# Patient Record
Sex: FEMALE | Race: WHITE | NOT HISPANIC OR LATINO | Employment: PART TIME | URBAN - METROPOLITAN AREA
[De-identification: names, ages, dates, MRNs, and addresses within clinical notes are randomized per-mention and may not be internally consistent; named-entity substitution may affect disease eponyms.]

---

## 2017-04-19 ENCOUNTER — APPOINTMENT (OUTPATIENT)
Dept: RADIOLOGY | Facility: IMAGING CENTER | Age: 57
End: 2017-04-19
Attending: NURSE PRACTITIONER
Payer: COMMERCIAL

## 2017-04-19 ENCOUNTER — HOSPITAL ENCOUNTER (OUTPATIENT)
Facility: MEDICAL CENTER | Age: 57
End: 2017-04-19
Attending: OBSTETRICS & GYNECOLOGY
Payer: COMMERCIAL

## 2017-04-19 ENCOUNTER — OFFICE VISIT (OUTPATIENT)
Dept: URGENT CARE | Facility: PHYSICIAN GROUP | Age: 57
End: 2017-04-19
Payer: COMMERCIAL

## 2017-04-19 VITALS
DIASTOLIC BLOOD PRESSURE: 86 MMHG | OXYGEN SATURATION: 100 % | RESPIRATION RATE: 16 BRPM | HEART RATE: 63 BPM | SYSTOLIC BLOOD PRESSURE: 130 MMHG | BODY MASS INDEX: 22.82 KG/M2 | TEMPERATURE: 99.1 F | WEIGHT: 142 LBS | HEIGHT: 66 IN

## 2017-04-19 DIAGNOSIS — S99.921A TOE INJURY, RIGHT, INITIAL ENCOUNTER: ICD-10-CM

## 2017-04-19 PROCEDURE — 88175 CYTOPATH C/V AUTO FLUID REDO: CPT

## 2017-04-19 PROCEDURE — 99202 OFFICE O/P NEW SF 15 MIN: CPT | Performed by: NURSE PRACTITIONER

## 2017-04-19 PROCEDURE — 73630 X-RAY EXAM OF FOOT: CPT | Mod: TC,RT | Performed by: NURSE PRACTITIONER

## 2017-04-19 PROCEDURE — 87624 HPV HI-RISK TYP POOLED RSLT: CPT

## 2017-04-19 PROCEDURE — L9900 O&P SUPPLY/ACCESSORY/SERVICE: HCPCS | Mod: RT | Performed by: NURSE PRACTITIONER

## 2017-04-19 ASSESSMENT — ENCOUNTER SYMPTOMS: CONSTITUTIONAL NEGATIVE: 1

## 2017-04-19 NOTE — MR AVS SNAPSHOT
"        Autumn Juárez   2017 3:45 PM   Office Visit   MRN: [id number]    Department:  [facility]   Dept Phone:  [phone]    Description:  Female : 1960   Provider:  Cathey J Hamman, A.P.N.           Reason for Visit     Toe Injury C/o RT toe pain, swollen, bruised x2 days.  PT stubbed her toe monday morning and it's still painful.      Allergies as of 2017     No Known Allergies      You were diagnosed with     Toe injury, right, initial encounter   [380730]         Vital Signs     Blood Pressure Pulse Temperature Respirations Height Weight    130/86 mmHg 63 37.3 °C (99.1 °F) 16 1.676 m (5' 5.98\") 64.411 kg (142 lb)    Body Mass Index Oxygen Saturation Smoking Status             22.93 kg/m2 100% Never Smoker          Basic Information     Date Of Birth Sex Race Ethnicity Preferred Language    1960 Female White Non- English      Your appointments     May 15, 2017  2:50 PM   MA SCRN10 with RBHC MG 2   [facility] BREAST [facility] ([address])    [address] [place] [address] [address]  [facility] [address]   [phone]           No deodorant, powder, perfume or lotion under the arm or breast area.              Problem List              ICD-10-CM Priority Class Noted - Resolved    Hypertension (Chronic) I10   8/3/2010 - Present    Preventative health care (Chronic) Z00.00   8/3/2010 - Present    Dyslipidemia (Chronic) E78.5   8/15/2010 - Present    Tonsillar hypertrophy J35.1   2010 - Present    Social anxiety disorder F40.10   2014 - Present    Dry eye syndrome H04.129   2016 - Present      Health Maintenance        Date Due Completion Dates    MAMMOGRAM 5/10/2017 5/10/2016, 2015, 2014, 5/3/2013, 2012, 3/9/2011, 2010, 2010, 2010, 2/3/2010, 2/3/2010, 2008, 2008, 2008, 2008, 2007, 11/15/2007, 11/15/2007, 2006, 2005, 2004    PAP SMEAR 2019    IMM DTaP/Tdap/Td Vaccine (2 - Td) " 10/18/2022 10/18/2012    COLONOSCOPY 6/6/2024 6/6/2014            Current Immunizations     Hepatitis A Vaccine, Adult 12/2/2016    Influenza TIV (IM) 10/18/2012    Influenza Vaccine Quad Inj (Pf) 10/3/2016  8:20 AM, 11/9/2015  8:02 AM, 10/13/2014    Influenza Vaccine Quad Inj (Preserved) 11/4/2013    Tdap Vaccine 10/18/2012      Below and/or attached are the medications your provider expects you to take. Review all of your home medications and newly ordered medications with your provider and/or pharmacist. Follow medication instructions as directed by your provider and/or pharmacist. Please keep your medication list with you and share with your provider. Update the information when medications are discontinued, doses are changed, or new medications (including over-the-counter products) are added; and carry medication information at all times in the event of emergency situations     Allergies:  No Known Allergies          Medications  Valid as of: April 19, 2017 -  5:00 PM    Generic Name Brand Name Tablet Size Instructions for use    Aspirin (Chew Tab) ASA 81 MG Take 1 Tab by mouth every day.        Calcium-Magnesium-Vitamin D   Take  by mouth.        Cetirizine HCl   Take  by mouth.        Cholecalciferol   Take  by mouth.        Fish Oil   by Does not apply route.        Metoprolol Succinate (TABLET SR 24 HR) TOPROL XL 25 MG Take 1 Tab by mouth every day.        Multiple Vitamins-Minerals   Take  by mouth.        .                 Medicines prescribed today were sent to:     [facility]/PHARMACY #[id number] - [place] - [address] [place] [address]    Phone: [phone] Fax: [phone]    Open 24 Hours?: No      Medication refill instructions:       If your prescription bottle indicates you have medication refills left, it is not necessary to call your provider’s office. Please contact your pharmacy and they will refill your medication.    If your prescription bottle indicates you do not have any refills  left, you may request refills at any time through one of the following ways: The online [employer] system (except Urgent Care), by calling your provider’s office, or by asking your pharmacy to contact your provider’s office with a refill request. Medication refills are processed only during regular business hours and may not be available until the next business day. Your provider may request additional information or to have a follow-up visit with you prior to refilling your medication.   *Please Note: Medication refills are assigned a new Rx number when refilled electronically. Your pharmacy may indicate that no refills were authorized even though a new prescription for the same medication is available at the pharmacy. Please request the medicine by name with the pharmacy before contacting your provider for a refill.        Your To Do List     Future Labs/Procedures Complete By Expires    DX-FOOT-COMPLETE 3+ RIGHT  As directed 4/19/2018      Other Notes About Your Plan     FIT  3/8/16 blood pressure elevated question white coat syndrome, check blood pressures and record, send me readings one to 2 months                   G[employer] Access Code: [email or web address]  Expires: 5/19/2017  5:00 PM    [employer]ogo[employer]in  A secure, online tool to manage your health information     [employer]’s GogoCoin® is a secure, online tool that connects you to your personalized health information from the privacy of your home -- day or night - making it very easy for you to manage your healthcare. Once the activation process is completed, you can even access your medical information using the GogoCoin gato, which is available for free in the Apple Gato store or Google Play store.     GogoCoin provides the following levels of access (as shown below):   My Chart Features   [facility] Doctor [facility]  Specialists [facility]gent  Care [facility]  Primary Care  Doctor   Email your healthcare team securely and privately 24/7 X X X    Manage  appointments: schedule your next appointment; view details of past/upcoming appointments X      Request prescription refills. X      View recent personal medical records, including lab and immunizations X X X X   View health record, including health history, allergies, medications X X X X   Read reports about your outpatient visits, procedures, consult and ER notes X X X X   See your discharge summary, which is a recap of your hospital and/or ER visit that includes your diagnosis, lab results, and care plan. X X       How to register for [employer]:  1. Go to  [email or web address].  2. Click on the Sign Up Now box, which takes you to the New Member Sign Up page. You will need to provide the following information:  a. Enter your NatureBox Access Code exactly as it appears at the top of this page. (You will not need to use this code after you’ve completed the sign-up process. If you do not sign up before the expiration date, you must request a new code.)   b. Enter your date of birth.   c. Enter your home email address.   d. Click Submit, and follow the next screen’s instructions.  3. Create a NatureBox ID. This will be your NatureBox login ID and cannot be changed, so think of one that is secure and easy to remember.  4. Create a NatureBox password. You can change your password at any time.  5. Enter your Password Reset Question and Answer. This can be used at a later time if you forget your password.   6. Enter your e-mail address. This allows you to receive e-mail notifications when new information is available in NatureBox.  7. Click Sign Up. You can now view your health information.    For assistance activating your NatureBox account, call [phone]

## 2017-04-19 NOTE — Clinical Note
April 19, 2017       Patient: Autumn Juárez   YOB: 1960   Date of Visit: 4/19/2017         To Whom It May Concern:    It is my medical opinion that Autumn Juárez may return to work on 4/24/17 .    If you have any questions or concerns, please don't hesitate to call [phone]          Sincerely,          Cathey J Hamman, A.P.N.  Electronically Signed

## 2017-04-19 NOTE — PROGRESS NOTES
"Subjective:      Autumn Juárez is a 56 y.o. female who presents with Toe Injury    PMH: no history of chronic illness    Social hx: non-smoker    Family hx: not pertinent to today's visit    Allergies: Review of patient's allergies indicates no known allergies.           This patient is a 56-year-old female who presents today with complaint of pain to the right second third and fourth toe. On Monday morning very early patient was getting out of bed and accidentally stubbed her toes against a large weight that was on the floor. She has been having pain and swelling with contusion ever since. She denies any other injuries.    Toe Injury  This is a new problem. The current episode started in the past 7 days. The problem occurs constantly. The problem has been unchanged. Nothing aggravates the symptoms. She has tried nothing for the symptoms. The treatment provided no relief.       Review of Systems   Constitutional: Negative.    Musculoskeletal:        Contusion and discoloration with pain to the right second third and fourth toe.          Objective:     /86 mmHg  Pulse 63  Temp(Src) 37.3 °C (99.1 °F)  Resp 16  Ht 1.676 m (5' 5.98\")  Wt 64.411 kg (142 lb)  BMI 22.93 kg/m2  SpO2 100%     Physical Exam   Constitutional: She is oriented to person, place, and time. She appears well-developed and well-nourished. No distress.   Musculoskeletal:        Right foot: There is decreased range of motion, tenderness and swelling.        Feet:    Contusion with discoloration over the right second third and fourth toe. Patient has decreased range of motion in the fourth toe with flexion.   Neurological: She is alert and oriented to person, place, and time.   Skin: Skin is warm and dry. She is not diaphoretic.   Psychiatric: She has a normal mood and affect. Her behavior is normal.   Vitals reviewed.    X-ray, foot:    4/19/2017 4:21 PM    HISTORY/REASON FOR EXAM:  Pain/Deformity Following Trauma.      TECHNIQUE/EXAM " DESCRIPTION:  3 views RIGHT foot.    COMPARISON:  None.    FINDINGS:  There is a fracture of the proximal phalanx of the right fourth toe. No other fractures identified.         Impression        Fracture proximal phalanx of the right fourth toe.                 Assessment/Plan:     1. Toe injury, right, initial encounter    - DX-FOOT-COMPLETE 3+ RIGHT; Future  -fracture of the proximal phalanx of the right fourth toe  -post op shoe  -elevate  -ice  -ibuprofen  -follow up ifs ymptoms persist or worsen

## 2017-04-21 LAB
CYTOLOGY REG CYTOL: NORMAL
HPV HR 12 DNA CVX QL NAA+PROBE: NEGATIVE
HPV16 DNA SPEC QL NAA+PROBE: NEGATIVE
HPV18 DNA SPEC QL NAA+PROBE: NEGATIVE
SPECIMEN SOURCE: NORMAL

## 2017-05-11 ENCOUNTER — OFFICE VISIT (OUTPATIENT)
Dept: MEDICAL GROUP | Facility: MEDICAL CENTER | Age: 57
End: 2017-05-11
Payer: COMMERCIAL

## 2017-05-11 ENCOUNTER — TELEPHONE (OUTPATIENT)
Dept: MEDICAL GROUP | Facility: MEDICAL CENTER | Age: 57
End: 2017-05-11

## 2017-05-11 VITALS
WEIGHT: 142 LBS | SYSTOLIC BLOOD PRESSURE: 126 MMHG | TEMPERATURE: 99.2 F | HEIGHT: 66 IN | OXYGEN SATURATION: 98 % | BODY MASS INDEX: 22.82 KG/M2 | DIASTOLIC BLOOD PRESSURE: 80 MMHG | HEART RATE: 61 BPM

## 2017-05-11 DIAGNOSIS — R22.31 MASS OF RIGHT AXILLA: ICD-10-CM

## 2017-05-11 DIAGNOSIS — Z00.00 PREVENTATIVE HEALTH CARE: Chronic | ICD-10-CM

## 2017-05-11 DIAGNOSIS — R22.31 AXILLARY MASS, RIGHT: ICD-10-CM

## 2017-05-11 DIAGNOSIS — I10 ESSENTIAL HYPERTENSION: Chronic | ICD-10-CM

## 2017-05-11 PROCEDURE — 99213 OFFICE O/P EST LOW 20 MIN: CPT | Performed by: INTERNAL MEDICINE

## 2017-05-11 ASSESSMENT — PATIENT HEALTH QUESTIONNAIRE - PHQ9: CLINICAL INTERPRETATION OF PHQ2 SCORE: 0

## 2017-05-11 NOTE — MR AVS SNAPSHOT
"        Autumn Juárez   2017 2:20 PM   Office Visit   MRN: [id number]    Department:  [facility]   Dept Phone:  [phone]    Description:  Female : 1960   Provider:  Master Dash M.D.           Allergies as of 2017     No Known Allergies      You were diagnosed with     Axillary mass, right   [008858]       Essential hypertension   [4798764]       Preventative health care   [349661]         Vital Signs     Blood Pressure Pulse Temperature Height Weight Body Mass Index    126/80 mmHg 61 37.3 °C (99.2 °F) 1.676 m (5' 6\") 64.411 kg (142 lb) 22.93 kg/m2    Oxygen Saturation Smoking Status                98% Never Smoker           Basic Information     Date Of Birth Sex Race Ethnicity Preferred Language    1960 Female White Non- English      Your appointments     May 15, 2017  2:50 PM   MA SCRN10 with RBHC MG 2   [facility] BREAST [facility] ([address])    [address] [place] [address] [address]  [facility] [address] [phone]           No deodorant, powder, perfume or lotion under the arm or breast area.              Problem List              ICD-10-CM Priority Class Noted - Resolved    Hypertension (Chronic) I10   8/3/2010 - Present    Preventative health care (Chronic) Z00.00   8/3/2010 - Present    Dyslipidemia (Chronic) E78.5   8/15/2010 - Present    Tonsillar hypertrophy J35.1   2010 - Present    Social anxiety disorder F40.10   2014 - Present    Dry eye syndrome H04.129   2016 - Present      Health Maintenance        Date Due Completion Dates    MAMMOGRAM 5/10/2017 5/10/2016, 2015, 2014, 5/3/2013, 2012, 3/9/2011, 2010, 2010, 2010, 2/3/2010, 2/3/2010, 2008, 2008, 2008, 2008, 2007, 11/15/2007, 11/15/2007, 2006, 2005, 2004    PAP SMEAR 2020, 2016    IMM DTaP/Tdap/Td Vaccine (2 - Td) 10/18/2022 10/18/2012    COLONOSCOPY 2024            Current " Immunizations     Hepatitis A Vaccine, Adult 12/2/2016    Influenza TIV (IM) 10/18/2012    Influenza Vaccine Quad Inj (Pf) 10/3/2016  8:20 AM, 11/9/2015  8:02 AM, 10/13/2014    Influenza Vaccine Quad Inj (Preserved) 11/4/2013    Tdap Vaccine 10/18/2012      Below and/or attached are the medications your provider expects you to take. Review all of your home medications and newly ordered medications with your provider and/or pharmacist. Follow medication instructions as directed by your provider and/or pharmacist. Please keep your medication list with you and share with your provider. Update the information when medications are discontinued, doses are changed, or new medications (including over-the-counter products) are added; and carry medication information at all times in the event of emergency situations     Allergies:  No Known Allergies          Medications  Valid as of: May 11, 2017 -  3:03 PM    Generic Name Brand Name Tablet Size Instructions for use    Aspirin (Chew Tab) ASA 81 MG Take 1 Tab by mouth every day.        Calcium-Magnesium-Vitamin D   Take  by mouth.        Cetirizine HCl   Take  by mouth.        Cholecalciferol   Take  by mouth.        Fish Oil   by Does not apply route.        Metoprolol Succinate (TABLET SR 24 HR) TOPROL XL 25 MG Take 1 Tab by mouth every day.        Multiple Vitamins-Minerals   Take  by mouth.        .                 Medicines prescribed today were sent to:     [facility]/PHARMACY #[id number] - [place] - [address] [place] [address]    Phone: [phone] Fax: [phone]    Open 24 Hours?: No      Medication refill instructions:       If your prescription bottle indicates you have medication refills left, it is not necessary to call your provider’s office. Please contact your pharmacy and they will refill your medication.    If your prescription bottle indicates you do not have any refills left, you may request refills at any time through one of the following ways:  The online [employer] system (except Urgent Care), by calling your provider’s office, or by asking your pharmacy to contact your provider’s office with a refill request. Medication refills are processed only during regular business hours and may not be available until the next business day. Your provider may request additional information or to have a follow-up visit with you prior to refilling your medication.   *Please Note: Medication refills are assigned a new Rx number when refilled electronically. Your pharmacy may indicate that no refills were authorized even though a new prescription for the same medication is available at the pharmacy. Please request the medicine by name with the pharmacy before contacting your provider for a refill.        Your To Do List     Future Labs/Procedures Complete By Expires    US-BREAST COMPLETE-RIGHT  As directed 5/11/2018    Scheduling Instructions:    ultrasound right axillary region for right axillary mass      Other Notes About Your Plan     FIT  5/11/17 right axillary cyst, decreasing in size, no evidence of abscess, ultrasound right axillary region ordered with upcoming mammogram                 [employer] Access Code: [email or web address]  Expires: 5/19/2017  5:00 PM    [employer]  A secure, online tool to manage your health information     [employer]’s [employer]® is a secure, online tool that connects you to your personalized health information from the privacy of your home -- day or night - making it very easy for you to manage your healthcare. Once the activation process is completed, you can even access your medical information using the [employer] gato, which is available for free in the Apple Gato store or Google Play store.     [employer] provides the following levels of access (as shown below):   My Chart Features   [facility] Doctor [facility]  Specialists [facility]  [facility]gent  Care [facility]  Primary Care  Doctor   Email your healthcare team securely and privately 24/7 X X X     Manage appointments: schedule your next appointment; view details of past/upcoming appointments X      Request prescription refills. X      View recent personal medical records, including lab and immunizations X X X X   View health record, including health history, allergies, medications X X X X   Read reports about your outpatient visits, procedures, consult and ER notes X X X X   See your discharge summary, which is a recap of your hospital and/or ER visit that includes your diagnosis, lab results, and care plan. X X       How to register for [employer]  1. Go to  [email or web address].  2. Click on the Sign Up Now box, which takes you to the New Member Sign Up page. You will need to provide the following information:  a. Enter your [employer] Access Code exactly as it appears at the top of this page. (You will not need to use this code after you’ve completed the sign-up process. If you do not sign up before the expiration date, you must request a new code.)   b. Enter your date of birth.   c. Enter your home email address.   d. Click Submit, and follow the next screen’s instructions.  3. Create a [employer] ID. This will be your [employer] login ID and cannot be changed, so think of one that is secure and easy to remember.  4. Create a [employer] password. You can change your password at any time.  5. Enter your Password Reset Question and Answer. This can be used at a later time if you forget your password.   6. Enter your e-mail address. This allows you to receive e-mail notifications when new information is available in [employer]  7. Click Sign Up. You can now view your health information.    For assistance activating your [employer] account, call [phone]

## 2017-05-11 NOTE — PROGRESS NOTES
Subjective:      Autumn Juárez is a 56 y.o. female who presents with lump        HPI  Has had right axillary lump for over twenty years, one week noticed a new bump under the arm, no redness, no drainage, no fever or chills, no trauma to the area, no insect bite, did have some swelling in his knee, now that area has been shrinking in size, still nontender.  No history of breast cancer.  No right shoulder or upper arm pain or swelling.  Three weeks ago accidentally kicked a weight/dumbbell and injured her right fourth toe, seen in urgent care proximal right fourth toe fracture, slowly improving, still with some pain, works as a  is on her feet all day.  Also working out with a  twice a week.  No numbness or tingling.  No regular anti-inflammatories.  Hypertension stable and controlled on Toprol, blood pressures have been controlled  No history of recurrent skin infections  No history of skin cancer  No history diabetes  No recent antibiotics, history needs suppression  No tobacco          Current Outpatient Prescriptions   Medication Sig Dispense Refill   • Multiple Vitamins-Minerals (MULTIVITAMIN PO) Take  by mouth.     • Cholecalciferol (VITAMIN D PO) Take  by mouth.     • Cetirizine HCl (ZYRTEC ALLERGY PO) Take  by mouth.     • metoprolol SR (TOPROL XL) 25 MG TABLET SR 24 HR Take 1 Tab by mouth every day. 90 Tab 1   • aspirin (ASA) 81 MG CHEW Take 1 Tab by mouth every day. 100 Tab 11   • FISH OIL by Does not apply route.     • Calcium Carbonate (CALCIUM 500 PO) Take  by mouth.       No current facility-administered medications for this visit.     Patient Active Problem List    Diagnosis Date Noted   • Dry eye syndrome 09/28/2016   • Social anxiety disorder 11/14/2014   • Tonsillar hypertrophy 12/17/2010   • Dyslipidemia 08/15/2010   • Hypertension 08/03/2010   • Preventative health care 08/03/2010       Dyslipidemia  8/10 chol 195,trig 63,hdl 62,ldl 120  8/11 chol 199,trig 63,hdl 54,ldl  132  5/13 chol 184,trig 56,hdl 58,ldl 115  4/18/14 chol 194,trig 71,hdl 60,ldl 120  5/5/14 chol 207,trig 124,hdl 57,ldl 125,LDL-P 1380,HDL-P 34,LP-IR <25; 10 year risk 2.1%  8/31/15 chol 201,trig 154,hdl 49,ldl 121  9/17/16 chol 197,trig 76,hdl 68,ldl 114    Hypertension  4/28/14 on toprol and start asa  5/2/14 urine mac <0.5 on toprol 25 mg  9/17/16 urine mac <0.7 on toprol 25 mg     Preventative health  10/18/12 tdap  4/28/14 pap per salvador  6/6/14 colon by [facility] diverticulosis  5/10/16 mammogram dense breast tissue  9/17/16 vit d 25 start 4000 units daily    Social anxiety disorder  11/14/14 social anxiety related to  who passed away being controlling, referral to behavioral health  12/17/14 behavioral health note      ROS       Objective:         Physical Exam   Constitutional: She appears well-developed and well-nourished. No distress.   HENT:   Head: Normocephalic and atraumatic.   Eyes: Conjunctivae are normal. Right eye exhibits no discharge. Left eye exhibits no discharge.   Neck: Neck supple. No JVD present. No thyromegaly present.   Cardiovascular: Normal rate and regular rhythm.    Pulmonary/Chest: Effort normal and breath sounds normal. No respiratory distress. She has no wheezes.   Abdominal: She exhibits no distension.   Musculoskeletal: She exhibits no edema or tenderness.   Neurological: She is alert.   Skin: Skin is warm. She is not diaphoretic. No erythema.   Nursing note and vitals reviewed.    Right axillary region, question small cyst 2 x 2 centimeter below the axillary region medial proximal humerus side, nontender, no erythema, no induration, no drainage or discharge, no other nodules or masses palpated  Right fourth toe moderate edema, limited flexion, no open sore or wound, normal sensation light touch, normal dorsalis pedis pulse          Assessment/Plan:     Assessment  #1 right axillary cyst?/Lesion appears to be decreasing in size according to the patient, nontender, no evidence of  abscess, no induration, no erythema, no fevers or chills, no drainage    #2 right fourth toe possible phalanx fracture, slowly healing, still some mild edema and tenderness to flexion partially because she is on her feet all day as a , seen in urgent care, had x-ray 4/19/17    #3 hypertension stable and controlled on Toprol without side effects of lightheadedness or dizziness      Plan  #1 right axillary ultrasound to be done with her upcoming mammogram which is screening in nature    #2 mimi tape third and fourth right toe, she declines to wear walking boot    #3 can utilize over the counter Naprosyn one pill twice daily for discomfort right toe    #4 continue check blood pressure and record    #5 nutrition and exercise discussed    #6 120 g of protein per day with weight training    #7 monitor for worsening right axillary cyst symptoms, notify us if increase in size, if develops pain, redness, discharge, or fever, currently no indication for antibiotics or drainage

## 2017-05-11 NOTE — TELEPHONE ENCOUNTER
1. Caller Name: Autumn A Gibson                        Call Back Number: [phone] (home) [phone] (work)      2. Message: Pt said RAD said that you need to order a Diag B/L Mammo, she said that would provide for the US that you wanted her to have.     3. Patient approves office to leave a detailed voicemail/MyChart message: no

## 2017-06-06 ENCOUNTER — HOSPITAL ENCOUNTER (OUTPATIENT)
Dept: RADIOLOGY | Facility: MEDICAL CENTER | Age: 57
End: 2017-06-06
Attending: INTERNAL MEDICINE
Payer: COMMERCIAL

## 2017-06-06 ENCOUNTER — TELEPHONE (OUTPATIENT)
Dept: MEDICAL GROUP | Facility: MEDICAL CENTER | Age: 57
End: 2017-06-06

## 2017-06-06 DIAGNOSIS — R22.31 MASS OF RIGHT AXILLA: ICD-10-CM

## 2017-06-06 DIAGNOSIS — R22.31 AXILLARY MASS, RIGHT: ICD-10-CM

## 2017-06-06 PROCEDURE — 76642 ULTRASOUND BREAST LIMITED: CPT | Mod: RT

## 2017-06-06 PROCEDURE — G0204 DX MAMMO INCL CAD BI: HCPCS

## 2017-06-30 ENCOUNTER — EH NON-PROVIDER (OUTPATIENT)
Dept: OCCUPATIONAL MEDICINE | Facility: CLINIC | Age: 57
End: 2017-06-30

## 2017-06-30 DIAGNOSIS — Z23 NEED FOR HEPATITIS A VACCINATION: ICD-10-CM

## 2017-06-30 PROCEDURE — 90632 HEPA VACCINE ADULT IM: CPT | Performed by: PREVENTIVE MEDICINE

## 2017-09-05 ENCOUNTER — HOSPITAL ENCOUNTER (OUTPATIENT)
Dept: LAB | Facility: MEDICAL CENTER | Age: 57
End: 2017-09-05
Payer: COMMERCIAL

## 2017-09-05 LAB
BDY FAT % MEASURED: 25.3 %
BP DIAS: 86 MMHG
BP SYS: 141 MMHG
CHOLEST SERPL-MCNC: 216 MG/DL (ref 100–199)
DIABETES HTDIA: NO
EVENT NAME HTEVT: NORMAL
FASTING HTFAS: YES
GLUCOSE SERPL-MCNC: 95 MG/DL (ref 65–99)
HDLC SERPL-MCNC: 59 MG/DL
HYPERTENSION HTHYP: YES
LDLC SERPL CALC-MCNC: 141 MG/DL
SCREENING LOC CITY HTCIT: NORMAL
SCREENING LOC STATE HTSTA: NORMAL
SCREENING LOCATION HTLOC: NORMAL
SMOKING HTSMO: NO
SUBSCRIBER ID HTSID: NORMAL
TRIGL SERPL-MCNC: 80 MG/DL (ref 0–149)

## 2017-09-05 PROCEDURE — 80061 LIPID PANEL: CPT

## 2017-09-05 PROCEDURE — 36415 COLL VENOUS BLD VENIPUNCTURE: CPT

## 2017-09-05 PROCEDURE — S5190 WELLNESS ASSESSMENT BY NONPH: HCPCS

## 2017-09-05 PROCEDURE — 82947 ASSAY GLUCOSE BLOOD QUANT: CPT

## 2017-09-06 ENCOUNTER — TELEPHONE (OUTPATIENT)
Dept: MEDICAL GROUP | Facility: MEDICAL CENTER | Age: 57
End: 2017-09-06

## 2017-09-06 NOTE — TELEPHONE ENCOUNTER
----- Message from Master Dash M.D. sent at 9/5/2017  6:26 PM PDT -----  Please notify patient that I have not seen her in over a year, she is due for her annual exam

## 2017-09-12 ENCOUNTER — OFFICE VISIT (OUTPATIENT)
Dept: MEDICAL GROUP | Facility: MEDICAL CENTER | Age: 57
End: 2017-09-12
Payer: COMMERCIAL

## 2017-09-12 VITALS
DIASTOLIC BLOOD PRESSURE: 84 MMHG | SYSTOLIC BLOOD PRESSURE: 110 MMHG | BODY MASS INDEX: 22.5 KG/M2 | OXYGEN SATURATION: 98 % | WEIGHT: 140 LBS | TEMPERATURE: 98.3 F | HEART RATE: 83 BPM | HEIGHT: 66 IN

## 2017-09-12 DIAGNOSIS — E78.5 DYSLIPIDEMIA: Chronic | ICD-10-CM

## 2017-09-12 DIAGNOSIS — Z23 FLU VACCINE NEED: ICD-10-CM

## 2017-09-12 DIAGNOSIS — Z00.00 PREVENTATIVE HEALTH CARE: Primary | Chronic | ICD-10-CM

## 2017-09-12 PROCEDURE — 90471 IMMUNIZATION ADMIN: CPT | Performed by: INTERNAL MEDICINE

## 2017-09-12 PROCEDURE — 99999 PR NO CHARGE: CPT | Performed by: INTERNAL MEDICINE

## 2017-09-12 PROCEDURE — 99396 PREV VISIT EST AGE 40-64: CPT | Mod: 25 | Performed by: INTERNAL MEDICINE

## 2017-09-12 PROCEDURE — 90686 IIV4 VACC NO PRSV 0.5 ML IM: CPT | Performed by: INTERNAL MEDICINE

## 2017-09-12 ASSESSMENT — ENCOUNTER SYMPTOMS
PALPITATIONS: 0
BLURRED VISION: 0
INSOMNIA: 0
BACK PAIN: 0
NAUSEA: 0
DEPRESSION: 0
HEADACHES: 0
COUGH: 0
DOUBLE VISION: 0
DIZZINESS: 0
HEARTBURN: 0
SPUTUM PRODUCTION: 0
BLOOD IN STOOL: 0
CONSTIPATION: 0

## 2017-09-15 ENCOUNTER — TELEPHONE (OUTPATIENT)
Dept: MEDICAL GROUP | Facility: MEDICAL CENTER | Age: 57
End: 2017-09-15

## 2017-09-16 NOTE — TELEPHONE ENCOUNTER
1. Caller Name: Autumn A Gibson                        Call Back Number: [phone] (home) [phone] (work)      2. Message: Pt states that when she was here for her appt the other day, you explained that if she needed something from you stating that her BP was controlled, that you would take care of it. She does need a statement for healthy trax.     3. Patient approves office to leave a detailed voicemail/MyChart message: no

## 2018-01-11 ENCOUNTER — HOSPITAL ENCOUNTER (OUTPATIENT)
Dept: LAB | Facility: MEDICAL CENTER | Age: 58
End: 2018-01-11
Attending: INTERNAL MEDICINE
Payer: COMMERCIAL

## 2018-01-11 DIAGNOSIS — Z00.00 PREVENTATIVE HEALTH CARE: Chronic | ICD-10-CM

## 2018-01-11 LAB
ALBUMIN SERPL BCP-MCNC: 4.6 G/DL (ref 3.2–4.9)
ALBUMIN/GLOB SERPL: 1.6 G/DL
ALP SERPL-CCNC: 104 U/L (ref 30–99)
ALT SERPL-CCNC: 22 U/L (ref 2–50)
ANION GAP SERPL CALC-SCNC: 7 MMOL/L (ref 0–11.9)
APPEARANCE UR: CLEAR
AST SERPL-CCNC: 24 U/L (ref 12–45)
BACTERIA #/AREA URNS HPF: ABNORMAL /HPF
BASOPHILS # BLD AUTO: 1.2 % (ref 0–1.8)
BASOPHILS # BLD: 0.07 K/UL (ref 0–0.12)
BILIRUB SERPL-MCNC: 0.5 MG/DL (ref 0.1–1.5)
BILIRUB UR QL STRIP.AUTO: NEGATIVE
BUN SERPL-MCNC: 27 MG/DL (ref 8–22)
CALCIUM SERPL-MCNC: 9.6 MG/DL (ref 8.5–10.5)
CHLORIDE SERPL-SCNC: 101 MMOL/L (ref 96–112)
CO2 SERPL-SCNC: 29 MMOL/L (ref 20–33)
COLOR UR: YELLOW
CREAT SERPL-MCNC: 0.85 MG/DL (ref 0.5–1.4)
CREAT UR-MCNC: 19.2 MG/DL
CULTURE IF INDICATED INDCX: YES UA CULTURE
EOSINOPHIL # BLD AUTO: 0.09 K/UL (ref 0–0.51)
EOSINOPHIL NFR BLD: 1.5 % (ref 0–6.9)
EPI CELLS #/AREA URNS HPF: ABNORMAL /HPF
ERYTHROCYTE [DISTWIDTH] IN BLOOD BY AUTOMATED COUNT: 41.6 FL (ref 35.9–50)
EST. AVERAGE GLUCOSE BLD GHB EST-MCNC: 111 MG/DL
GLOBULIN SER CALC-MCNC: 2.8 G/DL (ref 1.9–3.5)
GLUCOSE SERPL-MCNC: 81 MG/DL (ref 65–99)
GLUCOSE UR STRIP.AUTO-MCNC: NEGATIVE MG/DL
HBA1C MFR BLD: 5.5 % (ref 0–5.6)
HCT VFR BLD AUTO: 48.2 % (ref 37–47)
HGB BLD-MCNC: 15.6 G/DL (ref 12–16)
HYALINE CASTS #/AREA URNS LPF: ABNORMAL /LPF
IMM GRANULOCYTES # BLD AUTO: 0.01 K/UL (ref 0–0.11)
IMM GRANULOCYTES NFR BLD AUTO: 0.2 % (ref 0–0.9)
KETONES UR STRIP.AUTO-MCNC: NEGATIVE MG/DL
LEUKOCYTE ESTERASE UR QL STRIP.AUTO: ABNORMAL
LYMPHOCYTES # BLD AUTO: 2.6 K/UL (ref 1–4.8)
LYMPHOCYTES NFR BLD: 43 % (ref 22–41)
MCH RBC QN AUTO: 30.8 PG (ref 27–33)
MCHC RBC AUTO-ENTMCNC: 32.4 G/DL (ref 33.6–35)
MCV RBC AUTO: 95.3 FL (ref 81.4–97.8)
MICRO URNS: ABNORMAL
MICROALBUMIN UR-MCNC: <0.7 MG/DL
MICROALBUMIN/CREAT UR: NORMAL MG/G (ref 0–30)
MONOCYTES # BLD AUTO: 0.57 K/UL (ref 0–0.85)
MONOCYTES NFR BLD AUTO: 9.4 % (ref 0–13.4)
NEUTROPHILS # BLD AUTO: 2.7 K/UL (ref 2–7.15)
NEUTROPHILS NFR BLD: 44.7 % (ref 44–72)
NITRITE UR QL STRIP.AUTO: NEGATIVE
NRBC # BLD AUTO: 0 K/UL
NRBC BLD-RTO: 0 /100 WBC
PH UR STRIP.AUTO: 7 [PH]
PLATELET # BLD AUTO: 240 K/UL (ref 164–446)
PMV BLD AUTO: 10.3 FL (ref 9–12.9)
POTASSIUM SERPL-SCNC: 5.1 MMOL/L (ref 3.6–5.5)
PROT SERPL-MCNC: 7.4 G/DL (ref 6–8.2)
PROT UR QL STRIP: NEGATIVE MG/DL
RBC # BLD AUTO: 5.06 M/UL (ref 4.2–5.4)
RBC # URNS HPF: ABNORMAL /HPF
RBC UR QL AUTO: NEGATIVE
SODIUM SERPL-SCNC: 137 MMOL/L (ref 135–145)
SP GR UR STRIP.AUTO: 1.01
UROBILINOGEN UR STRIP.AUTO-MCNC: 0.2 MG/DL
WBC # BLD AUTO: 6 K/UL (ref 4.8–10.8)
WBC #/AREA URNS HPF: ABNORMAL /HPF

## 2018-01-11 PROCEDURE — 87086 URINE CULTURE/COLONY COUNT: CPT

## 2018-01-11 PROCEDURE — 80053 COMPREHEN METABOLIC PANEL: CPT

## 2018-01-11 PROCEDURE — 81001 URINALYSIS AUTO W/SCOPE: CPT

## 2018-01-11 PROCEDURE — 82306 VITAMIN D 25 HYDROXY: CPT

## 2018-01-11 PROCEDURE — 83036 HEMOGLOBIN GLYCOSYLATED A1C: CPT

## 2018-01-11 PROCEDURE — 85025 COMPLETE CBC W/AUTO DIFF WBC: CPT

## 2018-01-11 PROCEDURE — 36415 COLL VENOUS BLD VENIPUNCTURE: CPT

## 2018-01-11 PROCEDURE — 84443 ASSAY THYROID STIM HORMONE: CPT

## 2018-01-11 PROCEDURE — 82043 UR ALBUMIN QUANTITATIVE: CPT

## 2018-01-11 PROCEDURE — 82570 ASSAY OF URINE CREATININE: CPT

## 2018-01-12 ENCOUNTER — TELEPHONE (OUTPATIENT)
Dept: MEDICAL GROUP | Facility: MEDICAL CENTER | Age: 58
End: 2018-01-12

## 2018-01-12 LAB
25(OH)D3 SERPL-MCNC: 40 NG/ML (ref 30–100)
TSH SERPL DL<=0.005 MIU/L-ACNC: 1.61 UIU/ML (ref 0.38–5.33)

## 2018-01-13 LAB
BACTERIA UR CULT: NORMAL
SIGNIFICANT IND 70042: NORMAL
SITE SITE: NORMAL
SOURCE SOURCE: NORMAL

## 2018-01-15 ENCOUNTER — TELEPHONE (OUTPATIENT)
Dept: MEDICAL GROUP | Facility: MEDICAL CENTER | Age: 58
End: 2018-01-15

## 2018-01-15 NOTE — TELEPHONE ENCOUNTER
----- Message from Master Dash M.D. sent at 1/13/2018  5:05 PM PST -----  Please notify patient that her urine test shows no infection

## 2018-01-31 ENCOUNTER — TELEPHONE (OUTPATIENT)
Dept: MEDICAL GROUP | Facility: MEDICAL CENTER | Age: 58
End: 2018-01-31

## 2018-01-31 NOTE — TELEPHONE ENCOUNTER
1. Caller Name: Pt                      Call Back Number: [phone]    2. Message: Pt left message requesting [facility] record to be faxed to [phone]. Record faxed. Left message to notify pt.     3. Patient approves office to leave a detailed voicemail/MyChart message: N\A

## 2018-02-14 ENCOUNTER — OFFICE VISIT (OUTPATIENT)
Dept: MEDICAL GROUP | Facility: MEDICAL CENTER | Age: 58
End: 2018-02-14
Payer: COMMERCIAL

## 2018-02-14 VITALS
BODY MASS INDEX: 23.1 KG/M2 | HEART RATE: 68 BPM | WEIGHT: 143.74 LBS | DIASTOLIC BLOOD PRESSURE: 80 MMHG | OXYGEN SATURATION: 100 % | SYSTOLIC BLOOD PRESSURE: 110 MMHG | HEIGHT: 66 IN | TEMPERATURE: 97.8 F

## 2018-02-14 DIAGNOSIS — R05.9 COUGH: ICD-10-CM

## 2018-02-14 DIAGNOSIS — J40 BRONCHITIS: ICD-10-CM

## 2018-02-14 DIAGNOSIS — Z00.00 PREVENTATIVE HEALTH CARE: Chronic | ICD-10-CM

## 2018-02-14 LAB
FLUAV+FLUBV AG SPEC QL IA: NEGATIVE
INT CON NEG: NEGATIVE
INT CON POS: POSITIVE

## 2018-02-14 PROCEDURE — 99214 OFFICE O/P EST MOD 30 MIN: CPT | Performed by: INTERNAL MEDICINE

## 2018-02-14 PROCEDURE — 87804 INFLUENZA ASSAY W/OPTIC: CPT | Performed by: INTERNAL MEDICINE

## 2018-02-14 RX ORDER — AZITHROMYCIN 250 MG/1
TABLET, FILM COATED ORAL
Qty: 6 TAB | Refills: 0 | Status: SHIPPED | OUTPATIENT
Start: 2018-02-14 | End: 2019-09-23

## 2018-02-14 RX ORDER — BENZONATATE 100 MG/1
100 CAPSULE ORAL 3 TIMES DAILY PRN
Qty: 60 CAP | Refills: 0 | Status: SHIPPED | OUTPATIENT
Start: 2018-02-14 | End: 2019-09-23

## 2018-02-14 NOTE — PROGRESS NOTES
CHIEF COMPLAINT  Chief Complaint   Patient presents with   • Fever   • Cough     sore Throat   • Nasal Congestion     HPI  Patient is a 57 y.o. female patient who presents today for the following     Cough  The patient got sick 3 days ago, with:  · Sore throat, improved  · Swollen glands, difficulty swallowing  · Nasal congestion w yellow d/c  · Postnasal drip, pain in sinus areas  · Fever yesterday, chills,  body aches, HA,   · Cough with yellow/gren sputum  · Decreased appetite.    Denies:   · Earache  · Wheezing, chest pain  · Nausea, vomiting, diarrhea, abdominal pain  · Skin rash.    · Meds used:   ibuprofen  · Sick contact:  Son, sick for 2 weeks  · Flu vaccine:    Y    Reviewed PMH, PSH, FH, SH, ALL, HCM/IMM, no changes  Reviewed MEDS, no changes    Patient Active Problem List    Diagnosis Date Noted   • Dry eye syndrome 09/28/2016   • Social anxiety disorder 11/14/2014   • Tonsillar hypertrophy 12/17/2010   • Dyslipidemia 08/15/2010   • Hypertension 08/03/2010   • Preventative health care 08/03/2010     CURRENT MEDICATIONS  Current Outpatient Prescriptions   Medication Sig Dispense Refill   • metoprolol SR (TOPROL XL) 25 MG TABLET SR 24 HR Take 1 Tab by mouth every day. 90 Tab 3   • Multiple Vitamins-Minerals (MULTIVITAMIN PO) Take  by mouth.     • Cholecalciferol (VITAMIN D PO) Take  by mouth.     • Cetirizine HCl (ZYRTEC ALLERGY PO) Take  by mouth.     • FISH OIL by Does not apply route.     • Calcium Carbonate (CALCIUM 500 PO) Take  by mouth.       No current facility-administered medications for this visit.      ALLERGIES  Allergies: Patient has no known allergies.  PAST MEDICAL HISTORY  No past medical history on file.  SURGICAL HISTORY  She  has no past surgical history on file.  SOCIAL HISTORY  Social History   Substance Use Topics   • Smoking status: Never Smoker   • Smokeless tobacco: Never Used   • Alcohol use 0.0 oz/week      Comment: occ     Social History     Social History Narrative   • No  "narrative on file     FAMILY HISTORY  Family History   Problem Relation Age of Onset   • Diabetes Mother    • Cancer Father      [email or web address]     Family Status   Relation Status   • Mother Alive   • Father    • Brother Alive   • Brother        ROS   Constitutional: as above.  HENT: as above.  Eyes: Negative for blurred vision.   Respiratory: as above.  Cardiovascular: Negative for chest pain, palpitations.   Gastrointestinal: Negative for heartburn, nausea, abdominal pain.   Genitourinary: Negative for dysuria.  Musculoskeletal: Negative for significant myalgias, back pain and joint pain.   Skin: Negative for rash and itching.   Neuro: Negative for dizziness, weakness and headaches.   Endo/Heme/Allergies: Does not bruise/bleed easily.   Psychiatric/Behavioral: Negative for depression.    PHYSICAL EXAM   Blood pressure 110/80, pulse 68, temperature 36.6 °C (97.8 °F), height 1.676 m (5' 6\"), weight 65.2 kg (143 lb 11.8 oz), SpO2 100 %. Body mass index is 23.2 kg/m².  General:  NAD, appears acutely sick  HEENT:   NC/AT, PERRLA, EOMI, TMs are clear. Pharyngeal mucosa is erythematous,  without lesions;  no cervical / supraclavicular  lymphadenopathy, no thyromegaly.    Cardiovascular: RRR.   No m/r/g. No carotid bruits .      Lungs:   Rhonchi on the back bilaterally, no w/r, no respiratory distress.  Abdomen: Soft, NT/ND + BS; no suprapubic tenderness; no masses or hepatosplenomegaly.  Extremities:  2+ DP and radial pulses bilaterally.  No c/c/e.   Skin:  Warm, dry.  No erythema. No rash.   Neurologic: Alert & oriented x 3. No focal deficits.  Psychiatric:  Affect normal, mood normal, judgment normal.    LABS     Labs are reviewed and discussed with a patient    POCT influenza: Negative    Lab Results   Component Value Date/Time    CHOLSTRLTOT 216 (H) 2017 09:25 AM     (H) 2017 09:25 AM    HDL 59 2017 09:25 AM    TRIGLYCERIDE 80 2017 09:25 AM       Lab Results   Component Value " Date/Time    SODIUM 137 01/11/2018 11:34 AM    POTASSIUM 5.1 01/11/2018 11:34 AM    CHLORIDE 101 01/11/2018 11:34 AM    CO2 29 01/11/2018 11:34 AM    GLUCOSE 81 01/11/2018 11:34 AM    BUN 27 (H) 01/11/2018 11:34 AM    CREATININE 0.85 01/11/2018 11:34 AM    CREATININE 0.98 08/14/2010 12:00 AM    BUNCREATRAT 13 08/14/2010 12:00 AM    GLOMRATE >59 08/14/2010 12:00 AM     Lab Results   Component Value Date/Time    ALKPHOSPHAT 104 (H) 01/11/2018 11:34 AM    ASTSGOT 24 01/11/2018 11:34 AM    ALTSGPT 22 01/11/2018 11:34 AM    TBILIRUBIN 0.5 01/11/2018 11:34 AM      Lab Results   Component Value Date/Time    HBA1C 5.5 01/11/2018 11:34 AM     Lab Results   Component Value Date/Time    TSH 1.980 08/14/2010 12:00 AM     No results found for: FREET4  Lab Results   Component Value Date/Time    WBC 6.0 01/11/2018 11:34 AM    WBC 4.7 08/14/2010 12:00 AM    RBC 5.06 01/11/2018 11:34 AM    RBC 4.61 08/14/2010 12:00 AM    HEMOGLOBIN 15.6 01/11/2018 11:34 AM    HEMATOCRIT 48.2 (H) 01/11/2018 11:34 AM    MCV 95.3 01/11/2018 11:34 AM    MCV 94 08/14/2010 12:00 AM    MCH 30.8 01/11/2018 11:34 AM    MCH 31.2 08/14/2010 12:00 AM    MCHC 32.4 (L) 01/11/2018 11:34 AM    MPV 10.3 01/11/2018 11:34 AM    NEUTSPOLYS 44.70 01/11/2018 11:34 AM    LYMPHOCYTES 43.00 (H) 01/11/2018 11:34 AM    MONOCYTES 9.40 01/11/2018 11:34 AM    EOSINOPHILS 1.50 01/11/2018 11:34 AM    BASOPHILS 1.20 01/11/2018 11:34 AM    HYPOCHROMIA RR 08/29/2011 10:25 AM      IMAGING     None    ASSESMENT AND PLAN       1. Cough  - POCT Influenza A/B    Advised   - increase fluid intake;   - may take Tylenol/ibuprofen for fever, pain; nasal decongestant    2. Bronchitis  - benzonatate (TESSALON) 100 MG Cap; Take 1 Cap by mouth 3 times a day as needed for Cough.  Dispense: 60 Cap; Refill: 0  - azithromycin (ZITHROMAX) 250 MG Tab; Take 1 tabl twice daily the first day, then 1 tabl daily, PO.  Dispense: 6 Tab; Refill: 0  Advised to take abx after a meal.   Side effects of abx use  discussed with a patient. Advised to stop abx and call if develop any side effect, including diarrhea.     3. Preventative health care  UTD    Counseling:   - Smoking:  Nonsmoker    Followup: as needed    All questions are answered.    Please note that this dictation was created using voice recognition software, and that there might be errors of oscar and possibly content.

## 2018-02-14 NOTE — LETTER
[facility]  [address]  [facility] [address]     February 14, 2018    Patient: Autumn Juárez   YOB: 1960   Date of Visit: 2/14/2018               To Whom It May Concern:            Autumn Juárez was seen and treated in our department on 2/14/2018.   Excuse from work from today, 2/14/2018, until 2/17/2018.           Sincerely,         Ash Le M.D.

## 2018-03-29 ENCOUNTER — OFFICE VISIT (OUTPATIENT)
Dept: MEDICAL GROUP | Facility: MEDICAL CENTER | Age: 58
End: 2018-03-29
Payer: COMMERCIAL

## 2018-03-29 VITALS
DIASTOLIC BLOOD PRESSURE: 76 MMHG | HEART RATE: 66 BPM | HEIGHT: 66 IN | SYSTOLIC BLOOD PRESSURE: 130 MMHG | BODY MASS INDEX: 23.3 KG/M2 | OXYGEN SATURATION: 97 % | WEIGHT: 145 LBS | TEMPERATURE: 98.3 F

## 2018-03-29 DIAGNOSIS — I10 ESSENTIAL HYPERTENSION: Chronic | ICD-10-CM

## 2018-03-29 DIAGNOSIS — M25.521 RIGHT ELBOW PAIN: ICD-10-CM

## 2018-03-29 DIAGNOSIS — E78.5 DYSLIPIDEMIA: Chronic | ICD-10-CM

## 2018-03-29 PROCEDURE — 99214 OFFICE O/P EST MOD 30 MIN: CPT | Performed by: INTERNAL MEDICINE

## 2018-03-29 NOTE — PROGRESS NOTES
Subjective:      Autumn Juárez is a 57 y.o. female who presents with Elbow Pain (Right)            HPI    In october hit elbow on something and since then at work especially when gripping objects will become painful, no swelling, no redness, no trauma after that, right handed female. Works at [employer]. Still weight training doing bench rest, squats, deadlift, no right elbow pain with lifting weights.  No previous history of elbow tendinitis, no neck pain, no shoulder pain, no radiculopathy, no sensory changes hands, no color changes hands or fingers, no swelling.  No history of left elbow pain or discomfort.  No overuse at home.  No hobbies that caused repetitive gripping or carrying things.  Has not tried anti-inflammatories over-the-counter.  Hypertension, blood pressures are stable at home checking twice a day, remains on Toprol, blood pressures running 110-130 systolic over 60s diastolic.  No chest pain, shortness of breath, cough, lightheadedness, syncope or palpitations.  Low-sodium diet.  Gets regular exercise.  No tobacco, no alcohol, limited caffeine intake.  Has had mammogram          Current Outpatient Prescriptions   Medication Sig Dispense Refill   • metoprolol SR (TOPROL XL) 25 MG TABLET SR 24 HR Take 1 Tab by mouth every day. 90 Tab 3   • Multiple Vitamins-Minerals (MULTIVITAMIN PO) Take  by mouth.     • Cholecalciferol (VITAMIN D PO) Take  by mouth.     • FISH OIL by Does not apply route.     • Calcium Carbonate (CALCIUM 500 PO) Take  by mouth.     • benzonatate (TESSALON) 100 MG Cap Take 1 Cap by mouth 3 times a day as needed for Cough. 60 Cap 0   • azithromycin (ZITHROMAX) 250 MG Tab Take 1 tabl twice daily the first day, then 1 tabl daily, PO. 6 Tab 0   • Cetirizine HCl (ZYRTEC ALLERGY PO) Take  by mouth.       No current facility-administered medications for this visit.      Patient Active Problem List   Diagnosis   • Hypertension   • Preventative health care   • Dyslipidemia   • Tonsillar  hypertrophy   • Social anxiety disorder   • Dry eye syndrome         Dyslipidemia  8/10 chol 195,trig 63,hdl 62,ldl 120  8/11 chol 199,trig 63,hdl 54,ldl 132  5/13 chol 184,trig 56,hdl 58,ldl 115  4/18/14 chol 194,trig 71,hdl 60,ldl 120  5/5/14 chol 207,trig 124,hdl 57,ldl 125,LDL-P 1380,HDL-P 34,LP-IR <25; 10 year risk 2.1%  8/31/15 chol 201,trig 154,hdl 49,ldl 121  9/17/16 chol 197,trig 76,hdl 68,ldl 114  9/5/17 chol 216,trig 80,hdl 59,ldl 141     Hypertension  4/28/14 on toprol and start asa  5/2/14 urine mac <0.5 on toprol 25 mg  9/17/16 urine mac <0.7 on toprol 25 mg   1/12/18 urine mac <0.7 on toprol 25 mg     Preventative health/18/12 tdap  10/18/12 tdap  6/6/14 colon by [facility] diverticulosis  4/21/17 pap  gyn  6/6/17 mammogram diagnostic bilateral tomosynthesis and limited right ultrasound, palpable axillary masses corresponding to benign-appearing lipoma and area of scarring present for 20 years, continue annual mammography  1/12/18 vit d 40     Social anxiety disorder  11/14/14 social anxiety related to  who passed away being controlling, referral to behavioral health  12/17/14 behavioral health note     Tonsillar hypertrophy  12/10 ENT  note nasopharyngoscopy tonsillar hypertrophy, possible lingual tonsillitis, could not rule out underlying mass, will do course of abx and repeat nasopharyngoscopy an MRI tongue base to be done afterwards and  1/11 dr.van garrison note ENT repeat nasopharyngoscopy showed improved tonsillar hypertrophy and tonsillitis after antibiotics               Health Maintenance Summary                MAMMOGRAM Next Due 6/6/2018      Done 6/6/2017 MA-MAMMO DIAGNOSTIC BILAT W/TOMOSYNTHESIS W/CAD     Patient has more history with this topic...    PAP SMEAR Next Due 4/19/2020      Done 4/19/2017 PATHOLOGY GYN SPECIMEN     Patient has more history with this topic...    IMM DTaP/Tdap/Td Vaccine Next Due 10/18/2022      Done 10/18/2012 Imm Admin: Tdap Vaccine     "COLONOSCOPY Next Due 6/6/2024      Done 6/6/2014 AMB REFERRAL TO GI FOR COLONOSCOPY          Patient Care Team:  Master Dahs M.D. as PCP - General    ROS       Objective:     /76   Pulse 66   Temp 36.8 °C (98.3 °F)   Ht 1.676 m (5' 6\")   Wt 65.8 kg (145 lb)   SpO2 97%   BMI 23.40 kg/m²      Physical Exam   Constitutional: She appears well-developed and well-nourished. No distress.   HENT:   Head: Normocephalic and atraumatic.   Right Ear: External ear normal.   Left Ear: External ear normal.   Mouth/Throat: Oropharynx is clear and moist.   Eyes: Conjunctivae are normal. Right eye exhibits no discharge. Left eye exhibits no discharge.   Neck: Neck supple. No JVD present. No thyromegaly present.   Cardiovascular: Normal rate, regular rhythm and normal heart sounds.    Pulmonary/Chest: Effort normal and breath sounds normal. No respiratory distress. She has no wheezes.   Musculoskeletal: She exhibits no edema.   Neurological: She is alert.   Skin: Skin is warm. She is not diaphoretic.   Psychiatric: She has a normal mood and affect. Her behavior is normal.   Nursing note and vitals reviewed.    Right elbow normal range of motion, normal pronation, supination, flexion and extension, tenderness to palpation lateral or medial epicondyle right elbow, no edema, no tenderness olecranon bursa, normal right shoulder range of motion, normal right  strength, normal radial pulse, normal sensation hands, normal  strength right side            Assessment/Plan:     Assessment  #1 right elbow tendinitis and lateral epicondylitis, improving, still has some symptoms with activity, has not tried anti-inflammatories    #2 hypertension stable on Toprol, still exercises, blood pressures stable at home    #3 dyslipidemia diet-controlled        Plan  #1 reassurance regarding epicondylitis right upper, no need for imaging, and I do not believe injections would be helpful, and informed her that studies have shown that " steroid injections may help initially but long-term may increase recurrence of tendinitis    #2 she can use a forearm band over the right forearm below the elbow    #3 can use over-the-counter Aleve or Advil as needed Munter for GI side effects    #4 minimize heavy lifting and repetitive activities, notify us if symptoms worsen, can consider physical therapy     #5 lifestyle modification, nutrition, diet, exercise discussed    #6 check blood pressure on a regular basis and record, continue Toprol    #7 follow-up 6 months    #8 follow up with gynecology    #9 greater than 20 minutes spent, over half the visit, discussing nutrition, diet, exercise, weight lifting including form regarding squat, dead lift,bench press, protein intake, hydration, supplements, stretching, massage therapy, foam rolling, blood pressure control and checking blood pressure on a regular basis, protein intake, carbohydrate intake, hydration, sleep

## 2018-05-29 ENCOUNTER — APPOINTMENT (OUTPATIENT)
Dept: RADIOLOGY | Facility: MEDICAL CENTER | Age: 58
End: 2018-05-29
Attending: OBSTETRICS & GYNECOLOGY
Payer: COMMERCIAL

## 2018-06-08 ENCOUNTER — HOSPITAL ENCOUNTER (OUTPATIENT)
Dept: RADIOLOGY | Facility: MEDICAL CENTER | Age: 58
End: 2018-06-08
Attending: OBSTETRICS & GYNECOLOGY
Payer: COMMERCIAL

## 2018-06-08 DIAGNOSIS — Z12.31 VISIT FOR SCREENING MAMMOGRAM: ICD-10-CM

## 2018-06-08 PROCEDURE — 77067 SCR MAMMO BI INCL CAD: CPT

## 2019-08-05 ENCOUNTER — HOSPITAL ENCOUNTER (OUTPATIENT)
Dept: RADIOLOGY | Facility: MEDICAL CENTER | Age: 59
End: 2019-08-05
Attending: INTERNAL MEDICINE
Payer: OTHER GOVERNMENT

## 2019-08-05 DIAGNOSIS — Z12.31 VISIT FOR SCREENING MAMMOGRAM: ICD-10-CM

## 2019-08-05 PROCEDURE — 77063 BREAST TOMOSYNTHESIS BI: CPT

## 2019-09-23 ENCOUNTER — OFFICE VISIT (OUTPATIENT)
Dept: MEDICAL GROUP | Facility: MEDICAL CENTER | Age: 59
End: 2019-09-23
Payer: OTHER GOVERNMENT

## 2019-09-23 VITALS
WEIGHT: 144 LBS | TEMPERATURE: 99.3 F | DIASTOLIC BLOOD PRESSURE: 90 MMHG | HEIGHT: 66 IN | BODY MASS INDEX: 23.14 KG/M2 | HEART RATE: 66 BPM | SYSTOLIC BLOOD PRESSURE: 160 MMHG | OXYGEN SATURATION: 99 %

## 2019-09-23 DIAGNOSIS — I10 ESSENTIAL HYPERTENSION: ICD-10-CM

## 2019-09-23 DIAGNOSIS — Z00.00 PREVENTATIVE HEALTH CARE: Chronic | ICD-10-CM

## 2019-09-23 DIAGNOSIS — R03.0 ELEVATED BLOOD PRESSURE READING: ICD-10-CM

## 2019-09-23 DIAGNOSIS — Z12.11 COLON CANCER SCREENING: ICD-10-CM

## 2019-09-23 DIAGNOSIS — Z23 NEEDS FLU SHOT: ICD-10-CM

## 2019-09-23 PROCEDURE — 90686 IIV4 VACC NO PRSV 0.5 ML IM: CPT | Performed by: INTERNAL MEDICINE

## 2019-09-23 PROCEDURE — 99396 PREV VISIT EST AGE 40-64: CPT | Mod: 25 | Performed by: INTERNAL MEDICINE

## 2019-09-23 PROCEDURE — 90471 IMMUNIZATION ADMIN: CPT | Performed by: INTERNAL MEDICINE

## 2019-09-23 ASSESSMENT — ENCOUNTER SYMPTOMS
DEPRESSION: 0
INSOMNIA: 0
DOUBLE VISION: 0
BACK PAIN: 0
BLURRED VISION: 0
DIZZINESS: 0
PALPITATIONS: 0
SHORTNESS OF BREATH: 0
HEARTBURN: 0
CONSTIPATION: 0

## 2019-09-23 ASSESSMENT — PATIENT HEALTH QUESTIONNAIRE - PHQ9: CLINICAL INTERPRETATION OF PHQ2 SCORE: 0

## 2019-09-23 NOTE — PROGRESS NOTES
Subjective:      Autumn Juárez is a 58 y.o. female who presents with Annual Exam            HPI     Here for annual exam, medication, allergies, medical history, surgical history, social history, family history reviewed and updated  SH still exercises four days per week, and 1-2 days of cardio, works with , does stretching, diet is clean and has been on the keto diet, caffeine 2 cups per day, no regular energy drinks, no sodas, drinks water daily, works [employer], lives with two sons   FH no changes, one brother healthy  Sees gyn   Eye exam annually  Has white coat hypertension but blood pressure at home 120/70s on metoprolol daily 25 mg, did not take blood pressure pill yesterday,  limits salt in diet, no chest pain, shortness of breath, cough, lightheadedness, syncope with activity  No history of diabetes, no tobacco  Some ringing left ear with no hearing loss          Current Outpatient Medications   Medication Sig Dispense Refill   • metoprolol SR (TOPROL XL) 25 MG TABLET SR 24 HR Take 1 Tab by mouth every day. 90 Tab 3   • Multiple Vitamins-Minerals (MULTIVITAMIN PO) Take  by mouth.     • Cholecalciferol (VITAMIN D PO) Take  by mouth.     • Cetirizine HCl (ZYRTEC ALLERGY PO) Take  by mouth.     • FISH OIL by Does not apply route.     • Calcium Carbonate (CALCIUM 500 PO) Take  by mouth.     • benzonatate (TESSALON) 100 MG Cap Take 1 Cap by mouth 3 times a day as needed for Cough. (Patient not taking: Reported on 9/23/2019) 60 Cap 0   • azithromycin (ZITHROMAX) 250 MG Tab Take 1 tabl twice daily the first day, then 1 tabl daily, PO. (Patient not taking: Reported on 9/23/2019) 6 Tab 0     No current facility-administered medications for this visit.        Dyslipidemia  8/10 chol 195,trig 63,hdl 62,ldl 120  8/11 chol 199,trig 63,hdl 54,ldl 132  5/13 chol 184,trig 56,hdl 58,ldl 115  4/18/14 chol 194,trig 71,hdl 60,ldl 120  5/5/14 chol 207,trig 124,hdl 57,ldl 125,LDL-P 1380,HDL-P  34,LP-IR <25; 10 year risk 2.1%  8/31/15 chol 201,trig 154,hdl 49,ldl 121  9/17/16 chol 197,trig 76,hdl 68,ldl 114  9/5/17 chol 216,trig 80,hdl 59,ldl 141     Hypertension  4/28/14 on toprol and start asa  5/2/14 urine mac <0.5 on toprol 25 mg  9/17/16 urine mac <0.7 on toprol 25 mg   1/12/18 urine mac <0.7 on toprol 25 mg     Preventative health/18/12 tdap  10/18/12 tdap  6/6/14 colon by [facility] diverticulosis  4/21/17 pap  gyn  1/12/18 vit d 40  8/8/19  mammogram       Social anxiety disorder  11/14/14 social anxiety related to  who passed away being controlling, referral to behavioral health  12/17/14 behavioral health note     Tonsillar hypertrophy  12/10 ENT  note nasopharyngoscopy tonsillar hypertrophy, possible lingual tonsillitis, could not rule out underlying mass, will do course of abx and repeat nasopharyngoscopy an MRI tongue base to be done afterwards and  1/11 dr.van garrison note ENT repeat nasopharyngoscopy showed improved tonsillar hypertrophy and tonsillitis after antibiotics         Patient Active Problem List   Diagnosis   • Hypertension   • Preventative health care   • Dyslipidemia   • Tonsillar hypertrophy   • Social anxiety disorder   • Dry eye syndrome       Depression Screening    Little interest or pleasure in doing things?  0 - not at all  Feeling down, depressed , or hopeless? 0 - not at all  Patient Health Questionnaire Score: 0          Health Maintenance Summary                HEPATITIS C SCREENING Overdue 1960     IMM INFLUENZA Overdue 9/1/2019      Done 9/12/2017 Imm Admin: Influenza Vaccine Quad Inj (Pf)     Patient has more history with this topic...    IMM ZOSTER VACCINES Postponed 2/20/2020 Originally 10/13/2011. System: vaccine not available, other system reasons    PAP SMEAR Next Due 4/19/2020      Done 4/19/2017 PATHOLOGY GYN SPECIMEN     Patient has more history with this topic...    MAMMOGRAM Next Due 8/5/2020      Done 8/5/2019 MA-SCREENING MAMMO  "BILAT W/TOMOSYNTHESIS W/CAD     Patient has more history with this topic...    IMM DTaP/Tdap/Td Vaccine Next Due 10/18/2022      Done 10/18/2012 Imm Admin: Tdap Vaccine    COLONOSCOPY Next Due 6/6/2024      Done 6/6/2014 AMB REFERRAL TO GI FOR COLONOSCOPY          Patient Care Team:  Master Dash M.D. as PCP - General    Review of Systems   Constitutional: Negative for malaise/fatigue.   HENT: Positive for tinnitus. Negative for hearing loss.    Eyes: Negative for blurred vision and double vision.   Respiratory: Negative for shortness of breath.    Cardiovascular: Negative for chest pain and palpitations.   Gastrointestinal: Negative for constipation and heartburn.   Genitourinary: Negative for frequency.   Musculoskeletal: Negative for back pain.   Neurological: Negative for dizziness.   Endo/Heme/Allergies: Negative for environmental allergies.   Psychiatric/Behavioral: Negative for depression. The patient does not have insomnia.      Left ear tinnitus, no hearing loss     Objective:     BP (!) 184/102 (BP Location: Right arm, Patient Position: Sitting, BP Cuff Size: Adult)   Pulse 66   Temp 37.4 °C (99.3 °F)   Ht 1.676 m (5' 6\")   Wt 65.3 kg (144 lb)   LMP 06/01/2011   SpO2 99%   BMI 23.24 kg/m²      Physical Exam   Constitutional: She appears well-developed and well-nourished. No distress.   HENT:   Head: Normocephalic and atraumatic.   Right Ear: External ear normal.   Left Ear: External ear normal.   Nose: Nose normal.   Mouth/Throat: Oropharynx is clear and moist.   Eyes: Conjunctivae are normal. Right eye exhibits no discharge. Left eye exhibits no discharge.   Neck: Neck supple. No JVD present. No thyromegaly present.   Cardiovascular: Normal rate and regular rhythm.   Pulmonary/Chest: Effort normal and breath sounds normal.   Abdominal: She exhibits no distension.   Musculoskeletal: She exhibits no edema.   Lymphadenopathy:     She has no cervical adenopathy.   Neurological: She is alert. "   Skin: Skin is warm. She is not diaphoretic.   Psychiatric: She has a normal mood and affect. Her behavior is normal.   Nursing note and vitals reviewed.  Right arm 160/90 blood pressure done by myself            Assessment/Plan:     Assessment  #! Annual exam    #2 Dyslipidemia 9/5/17 chol 216,trig 80,hdl 59,ldl 141 diet controlled     #3 Hypertension blood pressure elevated today since did not take bp meds since yesterday on Toprol 25 mg with whitecoat component, blood pressure normally runs high in the office but normal at home     #4 Preventative health/18/12 tdap  10/18/12 tdap  6/6/14 colon by [facility] diverticulosis  4/21/17 pap  gyn  1/12/18 vit d 40  8/8/19  mammogram       Plan  #! Labs    #2 cologuard     #3 shingrix check with pharmacy      #4 flu vaccine today    #5 labs    #6 EKG sinus rhythm no LVH by criteria    #7 echo evaluate LVH with whitecoat hypertension    #8 take Toprol daily, check blood pressure at home, continue good nutrition, diet, exercise program    #9 follow-up gynecology    #10 follow-up 1 year

## 2019-10-08 ENCOUNTER — HOSPITAL ENCOUNTER (OUTPATIENT)
Dept: CARDIOLOGY | Facility: MEDICAL CENTER | Age: 59
End: 2019-10-08
Attending: INTERNAL MEDICINE
Payer: OTHER GOVERNMENT

## 2019-10-08 ENCOUNTER — TELEPHONE (OUTPATIENT)
Dept: MEDICAL GROUP | Facility: MEDICAL CENTER | Age: 59
End: 2019-10-08

## 2019-10-08 DIAGNOSIS — I10 ESSENTIAL HYPERTENSION: Chronic | ICD-10-CM

## 2019-10-08 DIAGNOSIS — R03.0 ELEVATED BLOOD PRESSURE READING: ICD-10-CM

## 2019-10-08 LAB
LV EJECT FRACT  99904: 65
LV EJECT FRACT MOD 2C 99903: 69.85
LV EJECT FRACT MOD 4C 99902: 66.89
LV EJECT FRACT MOD BP 99901: 66.9

## 2019-10-08 PROCEDURE — 93306 TTE W/DOPPLER COMPLETE: CPT | Mod: 26 | Performed by: INTERNAL MEDICINE

## 2019-10-08 PROCEDURE — 93306 TTE W/DOPPLER COMPLETE: CPT

## 2019-10-09 NOTE — TELEPHONE ENCOUNTER
Notified with echo results, no evidence of LVH, continue to monitor home blood pressures, notify us if home blood pressures are elevated above 140 consistently

## 2019-10-11 ENCOUNTER — TELEPHONE (OUTPATIENT)
Dept: MEDICAL GROUP | Facility: MEDICAL CENTER | Age: 59
End: 2019-10-11

## 2019-10-11 ENCOUNTER — HOSPITAL ENCOUNTER (OUTPATIENT)
Dept: LAB | Facility: MEDICAL CENTER | Age: 59
End: 2019-10-11
Attending: INTERNAL MEDICINE
Payer: OTHER GOVERNMENT

## 2019-10-11 DIAGNOSIS — I10 ESSENTIAL HYPERTENSION: ICD-10-CM

## 2019-10-11 DIAGNOSIS — Z00.00 PREVENTATIVE HEALTH CARE: Chronic | ICD-10-CM

## 2019-10-11 LAB
25(OH)D3 SERPL-MCNC: 47 NG/ML (ref 30–100)
ALBUMIN SERPL BCP-MCNC: 4.1 G/DL (ref 3.2–4.9)
ALBUMIN/GLOB SERPL: 1.6 G/DL
ALP SERPL-CCNC: 86 U/L (ref 30–99)
ALT SERPL-CCNC: 22 U/L (ref 2–50)
ANION GAP SERPL CALC-SCNC: 6 MMOL/L (ref 0–11.9)
APPEARANCE UR: CLEAR
AST SERPL-CCNC: 28 U/L (ref 12–45)
BACTERIA #/AREA URNS HPF: NEGATIVE /HPF
BASOPHILS # BLD AUTO: 1.2 % (ref 0–1.8)
BASOPHILS # BLD: 0.06 K/UL (ref 0–0.12)
BILIRUB SERPL-MCNC: 0.5 MG/DL (ref 0.1–1.5)
BILIRUB UR QL STRIP.AUTO: NEGATIVE
BUN SERPL-MCNC: 22 MG/DL (ref 8–22)
CALCIUM SERPL-MCNC: 9.4 MG/DL (ref 8.5–10.5)
CHLORIDE SERPL-SCNC: 102 MMOL/L (ref 96–112)
CHOLEST SERPL-MCNC: 225 MG/DL (ref 100–199)
CO2 SERPL-SCNC: 29 MMOL/L (ref 20–33)
COLOR UR: YELLOW
CREAT SERPL-MCNC: 0.95 MG/DL (ref 0.5–1.4)
EOSINOPHIL # BLD AUTO: 0.1 K/UL (ref 0–0.51)
EOSINOPHIL NFR BLD: 2 % (ref 0–6.9)
EPI CELLS #/AREA URNS HPF: NEGATIVE /HPF
ERYTHROCYTE [DISTWIDTH] IN BLOOD BY AUTOMATED COUNT: 43.6 FL (ref 35.9–50)
EST. AVERAGE GLUCOSE BLD GHB EST-MCNC: 108 MG/DL
GLOBULIN SER CALC-MCNC: 2.6 G/DL (ref 1.9–3.5)
GLUCOSE SERPL-MCNC: 88 MG/DL (ref 65–99)
GLUCOSE UR STRIP.AUTO-MCNC: NEGATIVE MG/DL
HBA1C MFR BLD: 5.4 % (ref 0–5.6)
HCT VFR BLD AUTO: 46.8 % (ref 37–47)
HCV AB SER QL: NEGATIVE
HDLC SERPL-MCNC: 58 MG/DL
HGB BLD-MCNC: 15.2 G/DL (ref 12–16)
HYALINE CASTS #/AREA URNS LPF: NORMAL /LPF
IMM GRANULOCYTES # BLD AUTO: 0.01 K/UL (ref 0–0.11)
IMM GRANULOCYTES NFR BLD AUTO: 0.2 % (ref 0–0.9)
KETONES UR STRIP.AUTO-MCNC: NEGATIVE MG/DL
LDLC SERPL CALC-MCNC: 148 MG/DL
LEUKOCYTE ESTERASE UR QL STRIP.AUTO: ABNORMAL
LYMPHOCYTES # BLD AUTO: 2.03 K/UL (ref 1–4.8)
LYMPHOCYTES NFR BLD: 41.5 % (ref 22–41)
MCH RBC QN AUTO: 31.5 PG (ref 27–33)
MCHC RBC AUTO-ENTMCNC: 32.5 G/DL (ref 33.6–35)
MCV RBC AUTO: 97.1 FL (ref 81.4–97.8)
MICRO URNS: ABNORMAL
MONOCYTES # BLD AUTO: 0.51 K/UL (ref 0–0.85)
MONOCYTES NFR BLD AUTO: 10.4 % (ref 0–13.4)
NEUTROPHILS # BLD AUTO: 2.18 K/UL (ref 2–7.15)
NEUTROPHILS NFR BLD: 44.7 % (ref 44–72)
NITRITE UR QL STRIP.AUTO: NEGATIVE
NRBC # BLD AUTO: 0 K/UL
NRBC BLD-RTO: 0 /100 WBC
PH UR STRIP.AUTO: 7 [PH] (ref 5–8)
PLATELET # BLD AUTO: 208 K/UL (ref 164–446)
PMV BLD AUTO: 10.1 FL (ref 9–12.9)
POTASSIUM SERPL-SCNC: 4 MMOL/L (ref 3.6–5.5)
PROT SERPL-MCNC: 6.7 G/DL (ref 6–8.2)
PROT UR QL STRIP: NEGATIVE MG/DL
RBC # BLD AUTO: 4.82 M/UL (ref 4.2–5.4)
RBC # URNS HPF: NORMAL /HPF
RBC UR QL AUTO: NEGATIVE
SODIUM SERPL-SCNC: 137 MMOL/L (ref 135–145)
SP GR UR STRIP.AUTO: 1.01
TRIGL SERPL-MCNC: 94 MG/DL (ref 0–149)
TSH SERPL DL<=0.005 MIU/L-ACNC: 2.6 UIU/ML (ref 0.38–5.33)
UROBILINOGEN UR STRIP.AUTO-MCNC: 0.2 MG/DL
WBC # BLD AUTO: 4.9 K/UL (ref 4.8–10.8)
WBC #/AREA URNS HPF: NORMAL /HPF

## 2019-10-11 PROCEDURE — 80053 COMPREHEN METABOLIC PANEL: CPT

## 2019-10-11 PROCEDURE — 83036 HEMOGLOBIN GLYCOSYLATED A1C: CPT

## 2019-10-11 PROCEDURE — 85025 COMPLETE CBC W/AUTO DIFF WBC: CPT

## 2019-10-11 PROCEDURE — 84244 ASSAY OF RENIN: CPT

## 2019-10-11 PROCEDURE — 83835 ASSAY OF METANEPHRINES: CPT

## 2019-10-11 PROCEDURE — 80061 LIPID PANEL: CPT

## 2019-10-11 PROCEDURE — 84443 ASSAY THYROID STIM HORMONE: CPT

## 2019-10-11 PROCEDURE — 82088 ASSAY OF ALDOSTERONE: CPT

## 2019-10-11 PROCEDURE — 86803 HEPATITIS C AB TEST: CPT

## 2019-10-11 PROCEDURE — 82306 VITAMIN D 25 HYDROXY: CPT

## 2019-10-11 PROCEDURE — 36415 COLL VENOUS BLD VENIPUNCTURE: CPT

## 2019-10-11 PROCEDURE — 81001 URINALYSIS AUTO W/SCOPE: CPT

## 2019-10-12 LAB — ALDOST SERPL-MCNC: 11 NG/DL

## 2019-10-13 ENCOUNTER — TELEPHONE (OUTPATIENT)
Dept: MEDICAL GROUP | Facility: MEDICAL CENTER | Age: 59
End: 2019-10-13

## 2019-10-13 LAB — RENIN PLAS-CCNC: 0.5 NG/ML/HR

## 2019-10-14 ENCOUNTER — TELEPHONE (OUTPATIENT)
Dept: MEDICAL GROUP | Facility: MEDICAL CENTER | Age: 59
End: 2019-10-14

## 2019-10-14 LAB
METANEPHS SERPL-SCNC: 0.14 NMOL/L (ref 0–0.49)
NORMETANEPHRINE SERPL-SCNC: 0.44 NMOL/L (ref 0–0.89)

## 2019-10-14 NOTE — TELEPHONE ENCOUNTER
Please notify patient that her blood test shows:  (1) normal blood sugar, liver function, kidney function, thyroid function  (2) normal vitamin D level  (3) total cholesterol is 225, good cholesterol is 58 (goal is above 40), bad cholesterol is 140 (goal is 100 or less) her cholesterol is high, and with high blood pressure, she could consider a low-dose cholesterol medication to decrease her risk of heart attack and stroke  (4) if she would like to try a cholesterol medication let me know

## 2019-10-14 NOTE — TELEPHONE ENCOUNTER
----- Message from Master Dash M.D. sent at 10/11/2019  2:25 PM PDT -----  Please notify patient that her Cologuard stool test is negative

## 2019-10-14 NOTE — TELEPHONE ENCOUNTER
----- Message from Master Dash M.D. sent at 10/13/2019 11:12 PM PDT -----  Please notify patient that her blood test shows:  (1) normal blood sugar, liver function, kidney function, thyroid function  (2) normal vitamin D level  (3) total cholesterol is 225, good cholesterol is 58 (goal is above 40), bad cholesterol is 140 (goal is 100 or less) her cholesterol is high, and with high blood pressure, she could consider a low-dose cholesterol medication to decrease her risk of heart attack and stroke  (4) if she would like to try a cholesterol medication let me know

## 2020-08-21 ENCOUNTER — HOSPITAL ENCOUNTER (OUTPATIENT)
Dept: RADIOLOGY | Facility: MEDICAL CENTER | Age: 60
End: 2020-08-21
Attending: OBSTETRICS & GYNECOLOGY
Payer: OTHER GOVERNMENT

## 2020-08-21 DIAGNOSIS — Z12.31 SCREENING MAMMOGRAM, ENCOUNTER FOR: ICD-10-CM

## 2020-08-21 PROCEDURE — 77067 SCR MAMMO BI INCL CAD: CPT

## 2020-10-19 ENCOUNTER — APPOINTMENT (OUTPATIENT)
Dept: MEDICAL GROUP | Facility: MEDICAL CENTER | Age: 60
End: 2020-10-19
Payer: OTHER GOVERNMENT

## 2020-10-28 ENCOUNTER — APPOINTMENT (RX ONLY)
Dept: URBAN - METROPOLITAN AREA CLINIC 22 | Facility: CLINIC | Age: 60
Setting detail: DERMATOLOGY
End: 2020-10-28

## 2020-10-28 DIAGNOSIS — L82.0 INFLAMED SEBORRHEIC KERATOSIS: ICD-10-CM

## 2020-10-28 DIAGNOSIS — L57.0 ACTINIC KERATOSIS: ICD-10-CM

## 2020-10-28 DIAGNOSIS — Z71.89 OTHER SPECIFIED COUNSELING: ICD-10-CM

## 2020-10-28 DIAGNOSIS — D22 MELANOCYTIC NEVI: ICD-10-CM

## 2020-10-28 DIAGNOSIS — D18.0 HEMANGIOMA: ICD-10-CM

## 2020-10-28 DIAGNOSIS — L82.1 OTHER SEBORRHEIC KERATOSIS: ICD-10-CM

## 2020-10-28 DIAGNOSIS — L81.4 OTHER MELANIN HYPERPIGMENTATION: ICD-10-CM

## 2020-10-28 PROBLEM — D18.01 HEMANGIOMA OF SKIN AND SUBCUTANEOUS TISSUE: Status: ACTIVE | Noted: 2020-10-28

## 2020-10-28 PROBLEM — D22.5 MELANOCYTIC NEVI OF TRUNK: Status: ACTIVE | Noted: 2020-10-28

## 2020-10-28 PROCEDURE — 17003 DESTRUCT PREMALG LES 2-14: CPT | Mod: 59

## 2020-10-28 PROCEDURE — 17110 DESTRUCTION B9 LES UP TO 14: CPT

## 2020-10-28 PROCEDURE — ? COUNSELING

## 2020-10-28 PROCEDURE — ? LIQUID NITROGEN

## 2020-10-28 PROCEDURE — 17000 DESTRUCT PREMALG LESION: CPT | Mod: 59

## 2020-10-28 PROCEDURE — 99203 OFFICE O/P NEW LOW 30 MIN: CPT | Mod: 25

## 2020-10-28 ASSESSMENT — LOCATION SIMPLE DESCRIPTION DERM
LOCATION SIMPLE: LEFT FOREHEAD
LOCATION SIMPLE: ABDOMEN
LOCATION SIMPLE: RIGHT ZYGOMA
LOCATION SIMPLE: LEFT SHOULDER
LOCATION SIMPLE: RIGHT CHEEK
LOCATION SIMPLE: SCALP
LOCATION SIMPLE: LEFT TEMPLE
LOCATION SIMPLE: LEFT UPPER BACK
LOCATION SIMPLE: UPPER BACK
LOCATION SIMPLE: LEFT FOREARM

## 2020-10-28 ASSESSMENT — LOCATION ZONE DERM
LOCATION ZONE: SCALP
LOCATION ZONE: TRUNK
LOCATION ZONE: ARM
LOCATION ZONE: FACE

## 2020-10-28 ASSESSMENT — LOCATION DETAILED DESCRIPTION DERM
LOCATION DETAILED: LEFT FOREHEAD
LOCATION DETAILED: RIGHT SUPERIOR PARIETAL SCALP
LOCATION DETAILED: LEFT CENTRAL TEMPLE
LOCATION DETAILED: LEFT POSTERIOR SHOULDER
LOCATION DETAILED: RIGHT SUPERIOR LATERAL MALAR CHEEK
LOCATION DETAILED: LEFT LATERAL ABDOMEN
LOCATION DETAILED: SUPERIOR THORACIC SPINE
LOCATION DETAILED: LEFT INFERIOR UPPER BACK
LOCATION DETAILED: RIGHT LATERAL ZYGOMA
LOCATION DETAILED: LEFT PROXIMAL DORSAL FOREARM

## 2020-10-28 NOTE — PROCEDURE: LIQUID NITROGEN
Duration Of Freeze Thaw-Cycle (Seconds): 3
Consent: The patient's consent was obtained including but not limited to risks of crusting, scabbing, blistering, scarring, darker or lighter pigmentary change, recurrence, incomplete removal and infection.
Detail Level: Detailed
Render Post-Care Instructions In Note?: no
Number Of Freeze-Thaw Cycles: 2 freeze-thaw cycles
Post-Care Instructions: I reviewed with the patient in detail post-care instructions. Patient is to wear sunprotection, and avoid picking at any of the treated lesions. Pt may apply Vaseline to crusted or scabbing areas.
Medical Necessity Information: It is in your best interest to select a reason for this procedure from the list below. All of these items fulfill various CMS LCD requirements except the new and changing color options.
Medical Necessity Clause: This procedure was medically necessary because the lesions that were treated were:

## 2020-11-17 ENCOUNTER — OFFICE VISIT (OUTPATIENT)
Dept: MEDICAL GROUP | Facility: MEDICAL CENTER | Age: 60
End: 2020-11-17
Payer: OTHER GOVERNMENT

## 2020-11-17 VITALS
WEIGHT: 143 LBS | OXYGEN SATURATION: 97 % | SYSTOLIC BLOOD PRESSURE: 146 MMHG | TEMPERATURE: 97.8 F | HEART RATE: 67 BPM | BODY MASS INDEX: 22.98 KG/M2 | DIASTOLIC BLOOD PRESSURE: 80 MMHG | HEIGHT: 66 IN

## 2020-11-17 DIAGNOSIS — R92.2 DENSE BREAST: ICD-10-CM

## 2020-11-17 DIAGNOSIS — M81.0 POSTMENOPAUSAL BONE LOSS: ICD-10-CM

## 2020-11-17 DIAGNOSIS — N95.2 VAGINAL ATROPHY: ICD-10-CM

## 2020-11-17 DIAGNOSIS — I10 ESSENTIAL HYPERTENSION: ICD-10-CM

## 2020-11-17 DIAGNOSIS — F40.10 SOCIAL PHOBIA: ICD-10-CM

## 2020-11-17 DIAGNOSIS — Z00.00 PREVENTATIVE HEALTH CARE: Chronic | ICD-10-CM

## 2020-11-17 DIAGNOSIS — R92.30 DENSE BREAST: ICD-10-CM

## 2020-11-17 PROCEDURE — 99396 PREV VISIT EST AGE 40-64: CPT | Performed by: INTERNAL MEDICINE

## 2020-11-17 RX ORDER — METOPROLOL SUCCINATE 25 MG/1
25 TABLET, EXTENDED RELEASE ORAL
Qty: 90 TAB | Refills: 3 | Status: SHIPPED | OUTPATIENT
Start: 2020-11-17 | End: 2021-12-27

## 2020-11-17 ASSESSMENT — PATIENT HEALTH QUESTIONNAIRE - PHQ9: CLINICAL INTERPRETATION OF PHQ2 SCORE: 0

## 2020-11-17 ASSESSMENT — FIBROSIS 4 INDEX: FIB4 SCORE: 1.69

## 2020-11-17 NOTE — PROGRESS NOTES
Subjective:      Autumn Juárez is a 60 y.o. female who presents with Annual Exam            HPI   Here for annual exam, medication, allergies, medical history, surgical history, social history, family history reviewed and updated  SH working out the gym 4 days per week lifting weights, tries to eat healthy, creatine supplement, drinks water, no soda, does drink whey and cassein, coffee in am, no etoh, no tobacco, drinks coffee two per day   Sleep 6-7 hours at night  Eye exam annually  Teeth cleaning has had two years ago  Sees gyn  8/21/20 mammogram per  gyn dense breast  Blood pressure runs 120/70s at home on metoprolol checking on a regular basis  Using vaginal estrogen per gynecology no oral HRT, no family history of breast cancer      Current Outpatient Medications   Medication Sig Dispense Refill   • metoprolol SR (TOPROL XL) 25 MG TABLET SR 24 HR TAKE 1 TABLET BY MOUTH EVERY DAY 90 Tab 0   • Coenzyme Q10 (CO Q 10 PO) Take  by mouth.     • CREATINE PO Take  by mouth.     • COLLAGEN PO Take  by mouth.     • Multiple Vitamins-Minerals (MULTIVITAMIN PO) Take  by mouth.     • Cholecalciferol (VITAMIN D PO) Take  by mouth.     • Cetirizine HCl (ZYRTEC ALLERGY PO) Take  by mouth.     • FISH OIL by Does not apply route.     • Calcium Carbonate (CALCIUM 500 PO) Take  by mouth.       No current facility-administered medications for this visit.           Dyslipidemia  8/10 chol 195,trig 63,hdl 62,ldl 120  8/11 chol 199,trig 63,hdl 54,ldl 132  5/13 chol 184,trig 56,hdl 58,ldl 115  4/18/14 chol 194,trig 71,hdl 60,ldl 120  5/5/14 chol 207,trig 124,hdl 57,ldl 125,LDL-P 1380,HDL-P 34,LP-IR <25; 10 year risk 2.1%  8/31/15 chol 201,trig 154,hdl 49,ldl 121  9/17/16 chol 197,trig 76,hdl 68,ldl 114  9/5/17 chol 216,trig 80,hdl 59,ldl 141  10/11/19 chol 225,trig 94,hdl 58,ldl 148; 10 year risk 5.9%      Hypertension  4/28/14 on toprol and start asa  5/2/14 urine mac <0.5 on toprol 25 mg  9/17/16 urine mac <0.7 on  toprol 25 mg   1/12/18 urine mac <0.7 on toprol 25 mg  10/8/19 echo normal LV function, EF 65%, no evidence of LVH continue to monitor home blood pressures on toprol notify us if systolic above 140 consistently  10/11/19 renin 0.5 and aldosterone 11.0, plasma metanephrine and normetanephrine normal on toprol 25 mg     Preventative health/18/12 tdap  10/18/12 tdap  6/6/14 colon by [facility] diverticulosis  8/8/19  mammogram    9/23/19 has seen  gyn   10/6/19 [facility]  10/7/19 cologuard negative  10/11/19 hep c ab negative  10/11/19 vit d 47     Social anxiety disorder  11/14/14 social anxiety related to  who passed away being controlling, referral to behavioral health  12/17/14 behavioral health note     Tonsillar hypertrophy  12/10 ENT  note nasopharyngoscopy tonsillar hypertrophy, possible lingual tonsillitis, could not rule out underlying mass, will do course of abx and repeat nasopharyngoscopy an MRI tongue base to be done afterwards and  1/11 dr.van garrison note ENT repeat nasopharyngoscopy showed improved tonsillar hypertrophy and tonsillitis after antibiotics        Patient Active Problem List   Diagnosis   • Hypertension   • Preventative health care   • Dyslipidemia   • Tonsillar hypertrophy   • Social anxiety disorder   • Dry eye syndrome     Depression Screening    Little interest or pleasure in doing things?  0 - not at all  Feeling down, depressed , or hopeless? 0 - not at all  Patient Health Questionnaire Score: 0        Health Maintenance Summary                PAP SMEAR Overdue 4/19/2020      Done 4/19/2017 THINPREP PAP WITH HPV     Patient has more history with this topic...    MAMMOGRAM Next Due 8/21/2021      Done 8/21/2020 MA-SCREENING MAMMO BILAT W/TOMOSYNTHESIS W/CAD     Patient has more history with this topic...    IMM DTaP/Tdap/Td Vaccine Next Due 10/18/2022      Done 10/18/2012 Imm Admin: Tdap Vaccine    COLONOSCOPY Next Due 6/6/2024      Done 6/6/2014 AMB  "REFERRAL TO GI FOR COLONOSCOPY          Patient Care Team:  Master Dash M.D. as PCP - General      ROS       Objective:     BP (!) 186/88 (BP Location: Right arm, Patient Position: Sitting, BP Cuff Size: Adult)   Pulse 67   Temp 36.6 °C (97.8 °F)   Ht 1.676 m (5' 6\")   Wt 64.9 kg (143 lb)   LMP 06/01/2011   SpO2 97%   BMI 23.08 kg/m²    Repeat blood pressure right arm 146/80 done by myself  Physical Exam  Vitals signs and nursing note reviewed.   Constitutional:       General: She is not in acute distress.     Appearance: Normal appearance.   HENT:      Head: Normocephalic and atraumatic.      Right Ear: External ear normal.      Left Ear: External ear normal.   Eyes:      Conjunctiva/sclera: Conjunctivae normal.   Neck:      Musculoskeletal: Neck supple.   Cardiovascular:      Rate and Rhythm: Normal rate and regular rhythm.      Heart sounds: Normal heart sounds. No murmur.   Pulmonary:      Effort: Pulmonary effort is normal.      Breath sounds: Normal breath sounds.   Abdominal:      General: There is no distension.   Neurological:      General: No focal deficit present.      Mental Status: She is alert.   Psychiatric:         Mood and Affect: Mood normal.         Behavior: Behavior normal.                 Assessment/Plan:        Assessment  #1 annual exam    #2 hypertension stable on Toprol 25 mg, whitecoat hypertension component, repeat blood pressure by myself 146/80, blood pressure 120 over 70s at home, echo last year no LVH    #3 dyslipidemia diet controlled 10/11/19 chol 225,trig 94,hdl 58,ldl 148; 10 year risk 5.9%     #4 preventative health  10/18/12 tdap  6/6/14 colon by [facility] diverticulosis  9/23/19 has seen  gyn   10/7/19 cologuard negative  10/11/19 hep c ab negative  10/11/19 vit d 47  12/5/19 shingrix second   8/21/20 mammogram dense breast per gyn     #5 dense breast on mammogram ordered by gynecology, no family history of breast cancer, no HRT oral    #6 postmenopausal bone " loss    Plan  #1 health maintenance reviewed and updated    #2 dexa     #3 sonocine for dense breast tissue, understanding this may be an out-of-pocket expense    #4 labs    #5 continue Toprol    #6 check blood pressure regularly and record notify me if elevated    #7 nutrition, diet, exercise discussed    #8 up-to-date on vaccines    #9 up-to-date on colon cancer screening    #10 follow-up 1 year

## 2021-02-16 ENCOUNTER — TELEPHONE (OUTPATIENT)
Dept: MEDICAL GROUP | Facility: MEDICAL CENTER | Age: 61
End: 2021-02-16

## 2021-02-16 ENCOUNTER — HOSPITAL ENCOUNTER (OUTPATIENT)
Dept: RADIOLOGY | Facility: MEDICAL CENTER | Age: 61
End: 2021-02-16
Attending: INTERNAL MEDICINE
Payer: OTHER GOVERNMENT

## 2021-02-16 DIAGNOSIS — R92.30 DENSE BREAST: ICD-10-CM

## 2021-02-16 DIAGNOSIS — R92.2 DENSE BREAST: ICD-10-CM

## 2021-02-16 DIAGNOSIS — M81.0 POSTMENOPAUSAL BONE LOSS: ICD-10-CM

## 2021-02-16 PROCEDURE — 77080 DXA BONE DENSITY AXIAL: CPT

## 2021-02-16 PROCEDURE — 76641 ULTRASOUND BREAST COMPLETE: CPT

## 2021-02-17 ENCOUNTER — TELEPHONE (OUTPATIENT)
Dept: MEDICAL GROUP | Facility: MEDICAL CENTER | Age: 61
End: 2021-02-17

## 2021-02-17 PROBLEM — M85.80 OSTEOPENIA: Status: ACTIVE | Noted: 2021-02-17

## 2021-02-17 NOTE — TELEPHONE ENCOUNTER
----- Message from Master Dash M.D. sent at 2/16/2021  5:59 PM PST -----  Please notify the patient that her screening breast ultrasound is negative, she can continue with annual mammography.

## 2021-02-17 NOTE — TELEPHONE ENCOUNTER
Please notify the patient that her screening breast ultrasound is negative, she can continue with annual mammography.

## 2021-02-18 NOTE — TELEPHONE ENCOUNTER
Notified with results, osteopenia, no need for medication, continue 1000 mg calcium carbonate, 5000 units vitamin D daily, follow-up vitamin D level pending, repeat bone density test 2 years

## 2021-02-24 ENCOUNTER — TELEPHONE (OUTPATIENT)
Dept: MEDICAL GROUP | Facility: MEDICAL CENTER | Age: 61
End: 2021-02-24

## 2021-02-24 DIAGNOSIS — E55.9 VITAMIN D DEFICIENCY: ICD-10-CM

## 2021-02-24 DIAGNOSIS — R74.8 ABNORMAL ALKALINE PHOSPHATASE TEST: ICD-10-CM

## 2021-02-24 LAB
25(OH)D3+25(OH)D2 SERPL-MCNC: 96 NG/ML (ref 30–100)
ALBUMIN SERPL-MCNC: 4.4 G/DL (ref 3.8–4.9)
ALBUMIN/GLOB SERPL: 1.8 {RATIO} (ref 1.2–2.2)
ALP SERPL-CCNC: 134 IU/L (ref 39–117)
ALT SERPL-CCNC: 26 IU/L (ref 0–32)
APPEARANCE UR: CLEAR
AST SERPL-CCNC: 33 IU/L (ref 0–40)
BACTERIA #/AREA URNS HPF: NORMAL /[HPF]
BASOPHILS # BLD AUTO: 0.1 X10E3/UL (ref 0–0.2)
BASOPHILS NFR BLD AUTO: 1 %
BILIRUB SERPL-MCNC: 0.4 MG/DL (ref 0–1.2)
BILIRUB UR QL STRIP: NEGATIVE
BUN SERPL-MCNC: 22 MG/DL (ref 6–24)
BUN/CREAT SERPL: 25 (ref 9–23)
CALCIUM SERPL-MCNC: 9.8 MG/DL (ref 8.7–10.2)
CASTS URNS MICRO: NORMAL
CASTS URNS QL MICRO: NORMAL
CHLORIDE SERPL-SCNC: 103 MMOL/L (ref 96–106)
CHOLEST SERPL-MCNC: 211 MG/DL (ref 100–199)
CO2 SERPL-SCNC: 22 MMOL/L (ref 20–29)
COLOR UR: YELLOW
CREAT SERPL-MCNC: 0.89 MG/DL (ref 0.57–1)
CRYSTALS URNS MICRO: NORMAL
EOSINOPHIL # BLD AUTO: 0.1 X10E3/UL (ref 0–0.4)
EOSINOPHIL NFR BLD AUTO: 1 %
EPI CELLS #/AREA URNS HPF: NORMAL /HPF (ref 0–10)
ERYTHROCYTE [DISTWIDTH] IN BLOOD BY AUTOMATED COUNT: 11.8 % (ref 11.7–15.4)
GLOBULIN SER CALC-MCNC: 2.5 G/DL (ref 1.5–4.5)
GLUCOSE SERPL-MCNC: 92 MG/DL (ref 65–99)
GLUCOSE UR QL: NEGATIVE
HBA1C MFR BLD: 5.5 % (ref 4.8–5.6)
HCT VFR BLD AUTO: 47 % (ref 34–46.6)
HDLC SERPL-MCNC: 66 MG/DL
HGB BLD-MCNC: 16 G/DL (ref 11.1–15.9)
HGB UR QL STRIP: NEGATIVE
IMM GRANULOCYTES # BLD AUTO: 0 X10E3/UL (ref 0–0.1)
IMM GRANULOCYTES NFR BLD AUTO: 0 %
IMMATURE CELLS  115398: ABNORMAL
KETONES UR QL STRIP: NEGATIVE
LABORATORY COMMENT REPORT: ABNORMAL
LDLC SERPL CALC-MCNC: 130 MG/DL (ref 0–99)
LEUKOCYTE ESTERASE UR QL STRIP: NEGATIVE
LYMPHOCYTES # BLD AUTO: 2.2 X10E3/UL (ref 0.7–3.1)
LYMPHOCYTES NFR BLD AUTO: 46 %
MCH RBC QN AUTO: 32.2 PG (ref 26.6–33)
MCHC RBC AUTO-ENTMCNC: 34 G/DL (ref 31.5–35.7)
MCV RBC AUTO: 95 FL (ref 79–97)
MICRO URNS: NORMAL
MICRO URNS: NORMAL
MICROALBUMIN UR-MCNC: <3 UG/ML
MONOCYTES # BLD AUTO: 0.5 X10E3/UL (ref 0.1–0.9)
MONOCYTES NFR BLD AUTO: 10 %
MORPHOLOGY BLD-IMP: ABNORMAL
MUCOUS THREADS URNS QL MICRO: PRESENT
NEUTROPHILS # BLD AUTO: 2.1 X10E3/UL (ref 1.4–7)
NEUTROPHILS NFR BLD AUTO: 42 %
NITRITE UR QL STRIP: NEGATIVE
NRBC BLD AUTO-RTO: ABNORMAL %
PH UR STRIP: 7 [PH] (ref 5–7.5)
PLATELET # BLD AUTO: 223 X10E3/UL (ref 150–450)
POTASSIUM SERPL-SCNC: 4.3 MMOL/L (ref 3.5–5.2)
PROT SERPL-MCNC: 6.9 G/DL (ref 6–8.5)
PROT UR QL STRIP: NEGATIVE
RBC # BLD AUTO: 4.97 X10E6/UL (ref 3.77–5.28)
RBC #/AREA URNS HPF: NORMAL /HPF (ref 0–2)
RENAL EPI CELLS #/AREA URNS HPF: NORMAL /[HPF]
SODIUM SERPL-SCNC: 141 MMOL/L (ref 134–144)
SP GR UR: 1.01 (ref 1–1.03)
T VAGINALIS URNS QL MICRO: NORMAL
TRIGL SERPL-MCNC: 86 MG/DL (ref 0–149)
TSH SERPL DL<=0.005 MIU/L-ACNC: 1.92 UIU/ML (ref 0.45–4.5)
UNIDENT CRYS URNS QL MICRO: NORMAL
URINALYSIS REFLEX  377202: NORMAL
URNS CMNT MICRO: NORMAL
UROBILINOGEN UR STRIP-MCNC: 0.2 MG/DL (ref 0.2–1)
VLDLC SERPL CALC-MCNC: 15 MG/DL (ref 5–40)
WBC # BLD AUTO: 5 X10E3/UL (ref 3.4–10.8)
WBC #/AREA URNS HPF: NORMAL /HPF (ref 0–5)
YEAST #/AREA URNS HPF: NORMAL /[HPF]

## 2021-02-25 NOTE — TELEPHONE ENCOUNTER
Notified with results, isolated elevated alkaline phosphatase, repeat enzymes and work-up 3 months, vitamin D elevated, taking 5000 units daily, decrease to every other day.  Recheck in 3 months.  Orders printed and mailed to patient.

## 2021-03-15 DIAGNOSIS — Z23 NEED FOR VACCINATION: ICD-10-CM

## 2021-03-24 ENCOUNTER — IMMUNIZATION (OUTPATIENT)
Dept: FAMILY PLANNING/WOMEN'S HEALTH CLINIC | Facility: IMMUNIZATION CENTER | Age: 61
End: 2021-03-24
Attending: INTERNAL MEDICINE
Payer: OTHER GOVERNMENT

## 2021-03-24 DIAGNOSIS — Z23 NEED FOR VACCINATION: ICD-10-CM

## 2021-03-24 DIAGNOSIS — Z23 ENCOUNTER FOR VACCINATION: Primary | ICD-10-CM

## 2021-03-24 PROCEDURE — 0001A PFIZER SARS-COV-2 VACCINE: CPT | Performed by: INTERNAL MEDICINE

## 2021-03-24 PROCEDURE — 91300 PFIZER SARS-COV-2 VACCINE: CPT | Performed by: INTERNAL MEDICINE

## 2021-04-15 ENCOUNTER — IMMUNIZATION (OUTPATIENT)
Dept: FAMILY PLANNING/WOMEN'S HEALTH CLINIC | Facility: IMMUNIZATION CENTER | Age: 61
End: 2021-04-15
Attending: INTERNAL MEDICINE
Payer: OTHER GOVERNMENT

## 2021-04-15 DIAGNOSIS — Z23 ENCOUNTER FOR VACCINATION: Primary | ICD-10-CM

## 2021-04-15 PROCEDURE — 91300 PFIZER SARS-COV-2 VACCINE: CPT | Performed by: INTERNAL MEDICINE

## 2021-04-15 PROCEDURE — 0002A PFIZER SARS-COV-2 VACCINE: CPT | Performed by: INTERNAL MEDICINE

## 2021-05-07 ENCOUNTER — TELEPHONE (OUTPATIENT)
Dept: MEDICAL GROUP | Facility: MEDICAL CENTER | Age: 61
End: 2021-05-07

## 2021-05-07 NOTE — TELEPHONE ENCOUNTER
Autumn Juárez  [phone] (home) [phone] (work)    Pt came into office and is requesting that you recode her labs. Copy of bill is in your conbon.

## 2021-05-10 NOTE — TELEPHONE ENCOUNTER
Please notify the patient that the labs ordered were coded as preventative test in the standard fashion, so perhaps her insurance coverage changed and now does not cover as many standard preventative labs?    Please call [facility] billing [phone] to see if the additional codes will help:    E78.5  I10  M81.0  E55.9

## 2021-05-10 NOTE — TELEPHONE ENCOUNTER
Jacobo rankin/Alcon @ [facility]. They submitted these charges using incorrect patient ID#. They will rebill, pt should disregard the invoice they rec'd. Patient notified.

## 2021-06-11 ENCOUNTER — HOSPITAL ENCOUNTER (OUTPATIENT)
Dept: LAB | Facility: MEDICAL CENTER | Age: 61
End: 2021-06-11
Attending: INTERNAL MEDICINE
Payer: OTHER GOVERNMENT

## 2021-06-11 DIAGNOSIS — E55.9 VITAMIN D DEFICIENCY: ICD-10-CM

## 2021-06-11 DIAGNOSIS — R74.8 ABNORMAL ALKALINE PHOSPHATASE TEST: ICD-10-CM

## 2021-06-11 LAB — 25(OH)D3 SERPL-MCNC: 88 NG/ML (ref 30–100)

## 2021-06-11 PROCEDURE — 36415 COLL VENOUS BLD VENIPUNCTURE: CPT

## 2021-06-11 PROCEDURE — 84080 ASSAY ALKALINE PHOSPHATASES: CPT

## 2021-06-11 PROCEDURE — 82306 VITAMIN D 25 HYDROXY: CPT

## 2021-06-11 PROCEDURE — 84075 ASSAY ALKALINE PHOSPHATASE: CPT

## 2021-06-11 PROCEDURE — 83516 IMMUNOASSAY NONANTIBODY: CPT

## 2021-06-13 LAB — MITOCHONDRIA M2 IGG SER-ACNC: 5.2 UNITS (ref 0–24.9)

## 2021-06-14 LAB
ALP BONE SERPL-CCNC: 54 U/L (ref 0–55)
ALP ISOS SERPL HS-CCNC: 0 U/L
ALP LIVER SERPL-CCNC: 57 U/L (ref 0–94)
ALP SERPL-CCNC: 111 U/L (ref 40–120)

## 2021-06-15 ENCOUNTER — TELEPHONE (OUTPATIENT)
Dept: MEDICAL GROUP | Facility: MEDICAL CENTER | Age: 61
End: 2021-06-15

## 2021-06-15 PROBLEM — R74.8 ABNORMAL ALKALINE PHOSPHATASE TEST: Status: ACTIVE | Noted: 2021-06-15

## 2021-06-15 RX ORDER — ESTRADIOL 0.1 MG/G
1 CREAM VAGINAL DAILY
COMMUNITY
Start: 2021-05-03 | End: 2021-10-19 | Stop reason: SDUPTHER

## 2021-06-15 NOTE — TELEPHONE ENCOUNTER
Patient notified with results.  Alkaline phosphatase normal, bone and liver fractions normal, ELVIN negative

## 2021-10-19 ENCOUNTER — HOSPITAL ENCOUNTER (OUTPATIENT)
Facility: MEDICAL CENTER | Age: 61
End: 2021-10-19
Attending: NURSE PRACTITIONER
Payer: OTHER GOVERNMENT

## 2021-10-19 ENCOUNTER — GYNECOLOGY VISIT (OUTPATIENT)
Dept: OBGYN | Facility: CLINIC | Age: 61
End: 2021-10-19
Payer: OTHER GOVERNMENT

## 2021-10-19 VITALS — WEIGHT: 144 LBS | DIASTOLIC BLOOD PRESSURE: 94 MMHG | SYSTOLIC BLOOD PRESSURE: 169 MMHG | BODY MASS INDEX: 23.24 KG/M2

## 2021-10-19 DIAGNOSIS — Z01.419 WELL WOMAN EXAM WITH ROUTINE GYNECOLOGICAL EXAM: ICD-10-CM

## 2021-10-19 DIAGNOSIS — N95.2 ATROPHIC VAGINITIS: ICD-10-CM

## 2021-10-19 DIAGNOSIS — Z86.32 HISTORY OF GESTATIONAL DIABETES: ICD-10-CM

## 2021-10-19 DIAGNOSIS — R92.30 DENSE BREASTS: ICD-10-CM

## 2021-10-19 DIAGNOSIS — Z12.4 SCREENING FOR CERVICAL CANCER: ICD-10-CM

## 2021-10-19 DIAGNOSIS — R92.2 DENSE BREASTS: ICD-10-CM

## 2021-10-19 PROCEDURE — 88175 CYTOPATH C/V AUTO FLUID REDO: CPT

## 2021-10-19 PROCEDURE — 99396 PREV VISIT EST AGE 40-64: CPT | Performed by: NURSE PRACTITIONER

## 2021-10-19 PROCEDURE — 87624 HPV HI-RISK TYP POOLED RSLT: CPT

## 2021-10-19 RX ORDER — ESTRADIOL 0.1 MG/G
1 CREAM VAGINAL DAILY
Qty: 42.5 G | Refills: 3 | Status: SHIPPED | OUTPATIENT
Start: 2021-10-19 | End: 2022-12-15 | Stop reason: SDUPTHER

## 2021-10-19 ASSESSMENT — FIBROSIS 4 INDEX: FIB4 SCORE: 1.74

## 2021-10-19 NOTE — PROGRESS NOTES
Autumn Juárez61 y.o. No obstetric history on file. female presents for Annual Well Woman Exam.    ROS: Patient is feeling well. No dyspnea or chest pain on exertion. No Abdominal pain, change in bowel habits, black or bloody stools. No urinary sx. GYN ROS:no menses for four years, no PMB, no abnormal bleeding, pelvic pain or discharge, no breast pain or new or enlarging lumps on self exam. Denies breast tenderness, mass, discharge, changes in size or contour, or abnormal cyclic discomfort. No neurological complaints.  Past Medical History:   Diagnosis Date   • Osteopenia      None  Allergy:   Patient has no known allergies.    LMP:       Patient's last menstrual period was 2011. . 9 yrs ago,   of lung cancer.  New relationship X few months, declines STI screening.    Denies dyspareunia     Menstrual History: postmenopausal  Contraceptive Method:none       Objective : The patient appears well, alert and oriented x 3, in no acute distress.  BP (!) 169/94 (BP Location: Right arm, Patient Position: Sitting)   Wt 65.3 kg (144 lb)  Repeat 161/94  HEENT Exam: EOMI, DANNIELLE, no adenopathy or thyromegaly.  Lungs: Clear to Auscultation and Percussion.  Cor: S1 and S2 normal, no murmurs, or rubs   Abdomen: Soft without tenderness, guarding mass or organomegaly.  Extremities: No edema, pulses equal  Neurological: Normal No focal signs  Breast Exam:Inspection negative. No nipple discharge or bleeding. No masses or nodularity palpable  Pelvic: External genitalia,urethral meatus, urethra, bladder and vagina normal. Cervix, uterus and adnexa intact and normal.  Anus and perineum normal. Bimanual and  without masses or tenderness.    Lab:No results found for this or any previous visit (from the past 336 hour(s)).    Assessment:  well woman  Elevated blood pressure    Plan:mammogram, heterogenously dense breasts and ABUS this last year  pap smear  return annually or prn  Advised to see PCP for elevated  BP  Calcium Supplements, weight bearing exercise, patient is a competitive power   Contraception:postmenopausal    Chaperone offered and accepted, Juan Jose

## 2021-10-19 NOTE — PROGRESS NOTES
Subjective     Autumn Juárez is a 61 y.o. female who presents with Gynecologic Exam (New patient/Annual)            Gynecologic Exam        ROS           Objective     BP (!) 169/94 (BP Location: Right arm, Patient Position: Sitting)   Wt 65.3 kg (144 lb)   LMP 06/01/2011   BMI 23.24 kg/m²      Physical Exam                        Assessment & Plan        There are no diagnoses linked to this encounter.

## 2021-10-20 DIAGNOSIS — Z12.4 SCREENING FOR CERVICAL CANCER: ICD-10-CM

## 2021-10-20 LAB
AMBIGUOUS DTTM AMBI4: NORMAL
CYTOLOGY REG CYTOL: NORMAL
HPV HR 12 DNA CVX QL NAA+PROBE: NEGATIVE
HPV16 DNA SPEC QL NAA+PROBE: NEGATIVE
HPV18 DNA SPEC QL NAA+PROBE: NEGATIVE
SPECIMEN SOURCE: NORMAL

## 2021-10-22 ENCOUNTER — TELEPHONE (OUTPATIENT)
Dept: OBGYN | Facility: CLINIC | Age: 61
End: 2021-10-22

## 2021-10-22 NOTE — RESULT ENCOUNTER NOTE
Called and spoke to patient to let her know that her Pap was normal and HPV was negative.  She may consider one more pap in 5 years and then not necessary to continue as long as it is negative

## 2021-10-26 ENCOUNTER — TELEPHONE (OUTPATIENT)
Dept: MEDICAL GROUP | Facility: MEDICAL CENTER | Age: 61
End: 2021-10-26

## 2021-10-26 ENCOUNTER — HOSPITAL ENCOUNTER (OUTPATIENT)
Dept: RADIOLOGY | Facility: MEDICAL CENTER | Age: 61
End: 2021-10-26
Attending: INTERNAL MEDICINE
Payer: OTHER GOVERNMENT

## 2021-10-26 DIAGNOSIS — Z12.31 VISIT FOR SCREENING MAMMOGRAM: ICD-10-CM

## 2021-10-26 PROCEDURE — 77063 BREAST TOMOSYNTHESIS BI: CPT

## 2021-10-27 NOTE — TELEPHONE ENCOUNTER
Please notify the patient that:  (1) her mammogram is negative but does show dense breast tissue which may decrease the accuracy of the mammogram  (2) she may obtain a screening breast ultrasound which could provide further detail  (3) her insurance may not cover the screening breast ultrasound, if that is the case, then the out of pocket cost is approximately $125  (4) please let me know if she is interested in obtaining the screening breast ultrasound

## 2021-10-28 ENCOUNTER — TELEPHONE (OUTPATIENT)
Dept: MEDICAL GROUP | Facility: MEDICAL CENTER | Age: 61
End: 2021-10-28

## 2021-10-28 NOTE — TELEPHONE ENCOUNTER
----- Message from Master Dash M.D. sent at 10/26/2021  5:08 PM PDT -----  Please notify the patient that:  (1) her mammogram is negative but does show dense breast tissue which may decrease the accuracy of the mammogram  (2) she may obtain a screening breast ultrasound which could provide further detail  (3) her insurance may not cover the screening breast ultrasound, if that is the case, then the out of pocket cost is approximately $125  (4) please let me know if she is interested in obtaining the screening breast ultrasound

## 2021-11-28 NOTE — PROGRESS NOTES
Called patient and informed that COVID is POSITIVE. Patient needs to quarantine for 10 days from onset of symptoms. In order to go back out in public, patient needs to remain fever free for 24 hours and have improvement of symptoms. Anyone that they have been in recent contact with they need to notify them that they are COVID POSITIVE. At any point if patient becomes short of breath or seriously ill they should go directly to the emergency department. Patient verbalized understanding.        Subjective:      Autumn Juárez is a 56 y.o. female who presents with annual           [facility]   Annual  Eye exam annually at [facility] glasses reading and distance  Sees gynecology annually no HRT  Hearing no changes, no tinnitus, no vertigo  Teeth cleaning twice per year  Allergies hay fever spring , no asthma, no cough  Blood pressure stable on metoprolol running 120/80 no side effectsNo chest pain, shortness of breath, lightheadedness, syncope, palpitation  Started with a  in april two days per week and has been working on eating a healthy diet  No nausea, vomiting, diarrhea, no melena or constipation  No dysuria, hematuria, urgency, no incontinence  No joint pain with exercise or activity, no back pain, hip pain, knee pain, no regular NSAIDs   Drinks water daily, no soda, no juices, coffee in am   Good sleep at night, denies anxiety or depression, lives with son  Medication, allergies, medical history, surgical history, social history, family history reviewed and updated  Trying to incorporate 100 g 250 g of protein per day, eating healthy, lean sources of food checking, fish, turkey, vegetables, healthy carbohydrates        Current Outpatient Prescriptions   Medication Sig Dispense Refill   • Multiple Vitamins-Minerals (MULTIVITAMIN PO) Take  by mouth.     • Cholecalciferol (VITAMIN D PO) Take  by mouth.     • Cetirizine HCl (ZYRTEC ALLERGY PO) Take  by mouth.     • metoprolol SR (TOPROL XL) 25 MG TABLET SR 24 HR Take 1 Tab by mouth every day. 90 Tab 1   • aspirin (ASA) 81 MG CHEW Take 1 Tab by mouth every day. 100 Tab 11   • FISH OIL by Does not apply route.     • Calcium Carbonate (CALCIUM 500 PO) Take  by mouth.       No current facility-administered medications for this visit.      Patient Active Problem List    Diagnosis Date Noted   • Dry eye syndrome 09/28/2016   • Social anxiety disorder 11/14/2014   • Tonsillar hypertrophy 12/17/2010   • Dyslipidemia 08/15/2010   • Hypertension  08/03/2010   • Preventative health care 08/03/2010       Dyslipidemia  8/10 chol 195,trig 63,hdl 62,ldl 120  8/11 chol 199,trig 63,hdl 54,ldl 132  5/13 chol 184,trig 56,hdl 58,ldl 115  4/18/14 chol 194,trig 71,hdl 60,ldl 120  5/5/14 chol 207,trig 124,hdl 57,ldl 125,LDL-P 1380,HDL-P 34,LP-IR <25; 10 year risk 2.1%  8/31/15 chol 201,trig 154,hdl 49,ldl 121  9/17/16 chol 197,trig 76,hdl 68,ldl 114  9/5/17 chol 216,trig 80,hdl 59,ldl 141    Hypertension  4/28/14 on toprol and start asa  5/2/14 urine mac <0.5 on toprol 25 mg  9/17/16 urine mac <0.7 on toprol 25 mg      Preventative health/18/12 tdap  6/6/14 colon by [facility] diverticulosis  9/17/16 vit d 25 start 4000 units daily  4/21/17 pap  gyn  6/6/17 mammogram diagnostic bilateral tomosynthesis and limited right ultrasound, palpable axillary masses corresponding to benign-appearing lipoma and area of scarring present for 20 years, continue annual mammography     Social anxiety disorder  11/14/14 social anxiety related to  who passed away being controlling, referral to behavioral health  12/17/14 behavioral health note                   Review of Systems   HENT: Negative for hearing loss.    Eyes: Negative for blurred vision and double vision.   Respiratory: Negative for cough and sputum production.    Cardiovascular: Negative for chest pain, palpitations and leg swelling.   Gastrointestinal: Negative for blood in stool, constipation, heartburn and nausea.   Genitourinary: Negative for frequency.   Musculoskeletal: Negative for back pain and joint pain.   Skin: Negative for rash.   Neurological: Negative for dizziness and headaches.   Endo/Heme/Allergies: Negative for environmental allergies.   Psychiatric/Behavioral: Negative for depression. The patient does not have insomnia.           Objective:          Physical Exam   Constitutional: She appears well-developed and well-nourished. No distress.   HENT:   Head: Normocephalic and atraumatic.   Right Ear:  External ear normal.   Left Ear: External ear normal.   Mouth/Throat: Oropharynx is clear and moist.   Eyes: Conjunctivae are normal. Right eye exhibits no discharge. Left eye exhibits no discharge.   Neck: Neck supple. No JVD present. No thyromegaly present.   Cardiovascular: Normal rate, regular rhythm and normal heart sounds.    No murmur heard.  Pulmonary/Chest: Effort normal and breath sounds normal. No respiratory distress. She has no wheezes.   Abdominal: Soft. Bowel sounds are normal. She exhibits no distension. There is no tenderness. There is no guarding.   Musculoskeletal: She exhibits no edema.   Neurological: She is alert.   Skin: Skin is warm. She is not diaphoretic. No erythema.   Psychiatric: She has a normal mood and affect. Her behavior is normal.   Nursing note and vitals reviewed.  No carotid bruits  No abdominal bruits  No hepatosplenomegaly            Assessment/Plan:     Assessment  #! Annual Examination    #2 Dyslipidemia diet-controlled 9/5/17 chol 216,trig 80,hdl 59,ldl 141    #3 Hypertension blood pressure stable and controlled on Toprol     #4 Preventative health  10/18/12 tdap  6/6/14 colon by C diverticulosis  9/17/16 vit d 25 start 4000 units daily  4/21/17 pap  gyn  6/6/17 mammogram diagnostic bilateral tomosynthesis and limited right ultrasound, palpable axillary masses corresponding to benign-appearing lipoma and area of scarring present for 20 years, continue annual mammography     #5 history of anxiety stable off medication         Plan  #1 reviewed labs from September 5    #2 10 year cardiac risk 2%, no statin therapy necessary, she can discontinue baby aspirin since she does bruise easily    #3 nutrition, diet, exercise discussed    #4 continue elliptical cardiovascular training, weight training increased to 3 days per week, incorporate stretching, yoga meditation, massage therapy    #5 annual mammogram    #6 follow-up gynecology    #7 next colonoscopy 2024    #8 next  td vaccine 2022    #9 influenza vaccine    #10 repeat labs ordered preventative    #11 follow-up one year

## 2021-12-27 ENCOUNTER — TELEPHONE (OUTPATIENT)
Dept: MEDICAL GROUP | Facility: MEDICAL CENTER | Age: 61
End: 2021-12-27

## 2021-12-27 DIAGNOSIS — I10 ESSENTIAL HYPERTENSION: ICD-10-CM

## 2021-12-27 RX ORDER — METOPROLOL SUCCINATE 25 MG/1
TABLET, EXTENDED RELEASE ORAL
Qty: 90 TABLET | Refills: 0 | Status: SHIPPED | OUTPATIENT
Start: 2021-12-27 | End: 2022-03-27

## 2022-03-27 ENCOUNTER — TELEPHONE (OUTPATIENT)
Dept: MEDICAL GROUP | Facility: MEDICAL CENTER | Age: 62
End: 2022-03-27
Payer: OTHER GOVERNMENT

## 2022-03-27 DIAGNOSIS — I10 ESSENTIAL HYPERTENSION: ICD-10-CM

## 2022-03-27 RX ORDER — METOPROLOL SUCCINATE 25 MG/1
TABLET, EXTENDED RELEASE ORAL
Qty: 90 TABLET | Refills: 0 | Status: SHIPPED | OUTPATIENT
Start: 2022-03-27 | End: 2022-06-23

## 2022-03-27 NOTE — TELEPHONE ENCOUNTER
Please notify the patient she needs to schedule a follow-up appointment, I have not seen her since November 2020

## 2022-04-08 PROBLEM — Z86.32 HISTORY OF GESTATIONAL DIABETES: Status: RESOLVED | Noted: 2021-10-19 | Resolved: 2022-04-08

## 2022-04-10 SDOH — ECONOMIC STABILITY: TRANSPORTATION INSECURITY
IN THE PAST 12 MONTHS, HAS THE LACK OF TRANSPORTATION KEPT YOU FROM MEDICAL APPOINTMENTS OR FROM GETTING MEDICATIONS?: NO

## 2022-04-10 SDOH — ECONOMIC STABILITY: FOOD INSECURITY: WITHIN THE PAST 12 MONTHS, THE FOOD YOU BOUGHT JUST DIDN'T LAST AND YOU DIDN'T HAVE MONEY TO GET MORE.: NEVER TRUE

## 2022-04-10 SDOH — ECONOMIC STABILITY: INCOME INSECURITY: HOW HARD IS IT FOR YOU TO PAY FOR THE VERY BASICS LIKE FOOD, HOUSING, MEDICAL CARE, AND HEATING?: NOT HARD AT ALL

## 2022-04-10 SDOH — ECONOMIC STABILITY: INCOME INSECURITY: IN THE LAST 12 MONTHS, WAS THERE A TIME WHEN YOU WERE NOT ABLE TO PAY THE MORTGAGE OR RENT ON TIME?: NO

## 2022-04-10 SDOH — ECONOMIC STABILITY: HOUSING INSECURITY: IN THE LAST 12 MONTHS, HOW MANY PLACES HAVE YOU LIVED?: 1

## 2022-04-10 SDOH — ECONOMIC STABILITY: HOUSING INSECURITY
IN THE LAST 12 MONTHS, WAS THERE A TIME WHEN YOU DID NOT HAVE A STEADY PLACE TO SLEEP OR SLEPT IN A SHELTER (INCLUDING NOW)?: NO

## 2022-04-10 SDOH — ECONOMIC STABILITY: FOOD INSECURITY: WITHIN THE PAST 12 MONTHS, YOU WORRIED THAT YOUR FOOD WOULD RUN OUT BEFORE YOU GOT MONEY TO BUY MORE.: NEVER TRUE

## 2022-04-10 SDOH — ECONOMIC STABILITY: TRANSPORTATION INSECURITY
IN THE PAST 12 MONTHS, HAS LACK OF TRANSPORTATION KEPT YOU FROM MEETINGS, WORK, OR FROM GETTING THINGS NEEDED FOR DAILY LIVING?: NO

## 2022-04-10 SDOH — HEALTH STABILITY: PHYSICAL HEALTH: ON AVERAGE, HOW MANY MINUTES DO YOU ENGAGE IN EXERCISE AT THIS LEVEL?: 90 MIN

## 2022-04-10 SDOH — HEALTH STABILITY: MENTAL HEALTH
STRESS IS WHEN SOMEONE FEELS TENSE, NERVOUS, ANXIOUS, OR CAN'T SLEEP AT NIGHT BECAUSE THEIR MIND IS TROUBLED. HOW STRESSED ARE YOU?: NOT AT ALL

## 2022-04-10 SDOH — HEALTH STABILITY: PHYSICAL HEALTH: ON AVERAGE, HOW MANY DAYS PER WEEK DO YOU ENGAGE IN MODERATE TO STRENUOUS EXERCISE (LIKE A BRISK WALK)?: 4 DAYS

## 2022-04-10 SDOH — ECONOMIC STABILITY: TRANSPORTATION INSECURITY
IN THE PAST 12 MONTHS, HAS LACK OF RELIABLE TRANSPORTATION KEPT YOU FROM MEDICAL APPOINTMENTS, MEETINGS, WORK OR FROM GETTING THINGS NEEDED FOR DAILY LIVING?: NO

## 2022-04-10 ASSESSMENT — SOCIAL DETERMINANTS OF HEALTH (SDOH)
HOW OFTEN DO YOU ATTEND CHURCH OR RELIGIOUS SERVICES?: NEVER
DO YOU BELONG TO ANY CLUBS OR ORGANIZATIONS SUCH AS CHURCH GROUPS UNIONS, FRATERNAL OR ATHLETIC GROUPS, OR SCHOOL GROUPS?: NO
IN A TYPICAL WEEK, HOW MANY TIMES DO YOU TALK ON THE PHONE WITH FAMILY, FRIENDS, OR NEIGHBORS?: THREE TIMES A WEEK
HOW MANY DRINKS CONTAINING ALCOHOL DO YOU HAVE ON A TYPICAL DAY WHEN YOU ARE DRINKING: 1 OR 2
HOW OFTEN DO YOU GET TOGETHER WITH FRIENDS OR RELATIVES?: TWICE A WEEK
HOW OFTEN DO YOU HAVE A DRINK CONTAINING ALCOHOL: MONTHLY OR LESS
WITHIN THE PAST 12 MONTHS, YOU WORRIED THAT YOUR FOOD WOULD RUN OUT BEFORE YOU GOT THE MONEY TO BUY MORE: NEVER TRUE
HOW OFTEN DO YOU ATTEND CHURCH OR RELIGIOUS SERVICES?: NEVER
HOW OFTEN DO YOU HAVE SIX OR MORE DRINKS ON ONE OCCASION: NEVER
HOW HARD IS IT FOR YOU TO PAY FOR THE VERY BASICS LIKE FOOD, HOUSING, MEDICAL CARE, AND HEATING?: NOT HARD AT ALL
IN A TYPICAL WEEK, HOW MANY TIMES DO YOU TALK ON THE PHONE WITH FAMILY, FRIENDS, OR NEIGHBORS?: THREE TIMES A WEEK
HOW OFTEN DO YOU GET TOGETHER WITH FRIENDS OR RELATIVES?: TWICE A WEEK
DO YOU BELONG TO ANY CLUBS OR ORGANIZATIONS SUCH AS CHURCH GROUPS UNIONS, FRATERNAL OR ATHLETIC GROUPS, OR SCHOOL GROUPS?: NO

## 2022-04-10 ASSESSMENT — LIFESTYLE VARIABLES
HOW OFTEN DO YOU HAVE A DRINK CONTAINING ALCOHOL: MONTHLY OR LESS
HOW OFTEN DO YOU HAVE SIX OR MORE DRINKS ON ONE OCCASION: NEVER
HOW MANY STANDARD DRINKS CONTAINING ALCOHOL DO YOU HAVE ON A TYPICAL DAY: 1 OR 2

## 2022-04-14 ENCOUNTER — OFFICE VISIT (OUTPATIENT)
Dept: MEDICAL GROUP | Facility: MEDICAL CENTER | Age: 62
End: 2022-04-14
Payer: OTHER GOVERNMENT

## 2022-04-14 VITALS
HEART RATE: 80 BPM | BODY MASS INDEX: 23.14 KG/M2 | HEIGHT: 66 IN | SYSTOLIC BLOOD PRESSURE: 168 MMHG | WEIGHT: 144 LBS | TEMPERATURE: 98.2 F | OXYGEN SATURATION: 96 % | DIASTOLIC BLOOD PRESSURE: 100 MMHG

## 2022-04-14 DIAGNOSIS — E55.9 VITAMIN D DEFICIENCY: ICD-10-CM

## 2022-04-14 DIAGNOSIS — M81.0 POSTMENOPAUSAL BONE LOSS: ICD-10-CM

## 2022-04-14 DIAGNOSIS — I10 PRIMARY HYPERTENSION: ICD-10-CM

## 2022-04-14 DIAGNOSIS — Z00.00 PREVENTATIVE HEALTH CARE: ICD-10-CM

## 2022-04-14 PROCEDURE — 99396 PREV VISIT EST AGE 40-64: CPT | Performed by: INTERNAL MEDICINE

## 2022-04-14 RX ORDER — LOSARTAN POTASSIUM 25 MG/1
25 TABLET ORAL DAILY
Qty: 30 TABLET | Refills: 2 | Status: SHIPPED | OUTPATIENT
Start: 2022-04-14 | End: 2022-05-06

## 2022-04-14 ASSESSMENT — FIBROSIS 4 INDEX: FIB4 SCORE: 1.74

## 2022-04-14 ASSESSMENT — PATIENT HEALTH QUESTIONNAIRE - PHQ9: CLINICAL INTERPRETATION OF PHQ2 SCORE: 0

## 2022-05-17 ENCOUNTER — HOSPITAL ENCOUNTER (OUTPATIENT)
Dept: RADIOLOGY | Facility: MEDICAL CENTER | Age: 62
End: 2022-05-17
Attending: INTERNAL MEDICINE
Payer: OTHER GOVERNMENT

## 2022-05-17 ENCOUNTER — TELEPHONE (OUTPATIENT)
Dept: MEDICAL GROUP | Facility: MEDICAL CENTER | Age: 62
End: 2022-05-17
Payer: OTHER GOVERNMENT

## 2022-05-17 DIAGNOSIS — M81.0 POSTMENOPAUSAL BONE LOSS: ICD-10-CM

## 2022-05-17 PROCEDURE — 77080 DXA BONE DENSITY AXIAL: CPT

## 2022-05-18 NOTE — TELEPHONE ENCOUNTER
Notified with results, osteopenia stable, repeat bone density test 2 years, continue vitamin D, calcium, weightbearing exercises.

## 2022-06-09 ENCOUNTER — HOSPITAL ENCOUNTER (OUTPATIENT)
Dept: LAB | Facility: MEDICAL CENTER | Age: 62
End: 2022-06-09
Attending: INTERNAL MEDICINE
Payer: OTHER GOVERNMENT

## 2022-06-09 DIAGNOSIS — Z00.00 PREVENTATIVE HEALTH CARE: ICD-10-CM

## 2022-06-09 DIAGNOSIS — E55.9 VITAMIN D DEFICIENCY: ICD-10-CM

## 2022-06-09 LAB
25(OH)D3 SERPL-MCNC: 81 NG/ML (ref 30–100)
ALBUMIN SERPL BCP-MCNC: 4.3 G/DL (ref 3.2–4.9)
ALBUMIN/GLOB SERPL: 1.9 G/DL
ALP SERPL-CCNC: 89 U/L (ref 30–99)
ALT SERPL-CCNC: 21 U/L (ref 2–50)
ANION GAP SERPL CALC-SCNC: 10 MMOL/L (ref 7–16)
APPEARANCE UR: ABNORMAL
AST SERPL-CCNC: 27 U/L (ref 12–45)
BACTERIA #/AREA URNS HPF: ABNORMAL /HPF
BASOPHILS # BLD AUTO: 1.7 % (ref 0–1.8)
BASOPHILS # BLD: 0.08 K/UL (ref 0–0.12)
BILIRUB SERPL-MCNC: 0.4 MG/DL (ref 0.1–1.5)
BILIRUB UR QL STRIP.AUTO: NEGATIVE
BUN SERPL-MCNC: 30 MG/DL (ref 8–22)
CALCIUM SERPL-MCNC: 9.2 MG/DL (ref 8.5–10.5)
CHLORIDE SERPL-SCNC: 104 MMOL/L (ref 96–112)
CHOLEST SERPL-MCNC: 279 MG/DL (ref 100–199)
CO2 SERPL-SCNC: 24 MMOL/L (ref 20–33)
COLOR UR: YELLOW
CREAT SERPL-MCNC: 0.93 MG/DL (ref 0.5–1.4)
CREAT UR-MCNC: 98.78 MG/DL
EOSINOPHIL # BLD AUTO: 0.12 K/UL (ref 0–0.51)
EOSINOPHIL NFR BLD: 2.6 % (ref 0–6.9)
EPI CELLS #/AREA URNS HPF: ABNORMAL /HPF
ERYTHROCYTE [DISTWIDTH] IN BLOOD BY AUTOMATED COUNT: 43.7 FL (ref 35.9–50)
EST. AVERAGE GLUCOSE BLD GHB EST-MCNC: 111 MG/DL
FASTING STATUS PATIENT QL REPORTED: NORMAL
GFR SERPLBLD CREATININE-BSD FMLA CKD-EPI: 70 ML/MIN/1.73 M 2
GLOBULIN SER CALC-MCNC: 2.3 G/DL (ref 1.9–3.5)
GLUCOSE SERPL-MCNC: 88 MG/DL (ref 65–99)
GLUCOSE UR STRIP.AUTO-MCNC: NEGATIVE MG/DL
HBA1C MFR BLD: 5.5 % (ref 4–5.6)
HCT VFR BLD AUTO: 44.7 % (ref 37–47)
HDLC SERPL-MCNC: 67 MG/DL
HGB BLD-MCNC: 14.9 G/DL (ref 12–16)
HYALINE CASTS #/AREA URNS LPF: ABNORMAL /LPF
IMM GRANULOCYTES # BLD AUTO: 0.01 K/UL (ref 0–0.11)
IMM GRANULOCYTES NFR BLD AUTO: 0.2 % (ref 0–0.9)
KETONES UR STRIP.AUTO-MCNC: NEGATIVE MG/DL
LDLC SERPL CALC-MCNC: 192 MG/DL
LEUKOCYTE ESTERASE UR QL STRIP.AUTO: ABNORMAL
LYMPHOCYTES # BLD AUTO: 1.92 K/UL (ref 1–4.8)
LYMPHOCYTES NFR BLD: 40.9 % (ref 22–41)
MCH RBC QN AUTO: 31.6 PG (ref 27–33)
MCHC RBC AUTO-ENTMCNC: 33.3 G/DL (ref 33.6–35)
MCV RBC AUTO: 94.7 FL (ref 81.4–97.8)
MICRO URNS: ABNORMAL
MICROALBUMIN UR-MCNC: <1.2 MG/DL
MICROALBUMIN/CREAT UR: NORMAL MG/G (ref 0–30)
MONOCYTES # BLD AUTO: 0.48 K/UL (ref 0–0.85)
MONOCYTES NFR BLD AUTO: 10.2 % (ref 0–13.4)
NEUTROPHILS # BLD AUTO: 2.08 K/UL (ref 2–7.15)
NEUTROPHILS NFR BLD: 44.4 % (ref 44–72)
NITRITE UR QL STRIP.AUTO: NEGATIVE
NRBC # BLD AUTO: 0 K/UL
NRBC BLD-RTO: 0 /100 WBC
PH UR STRIP.AUTO: 6.5 [PH] (ref 5–8)
PLATELET # BLD AUTO: 218 K/UL (ref 164–446)
PMV BLD AUTO: 10.1 FL (ref 9–12.9)
POTASSIUM SERPL-SCNC: 3.9 MMOL/L (ref 3.6–5.5)
PROT SERPL-MCNC: 6.6 G/DL (ref 6–8.2)
PROT UR QL STRIP: NEGATIVE MG/DL
RBC # BLD AUTO: 4.72 M/UL (ref 4.2–5.4)
RBC # URNS HPF: ABNORMAL /HPF
RBC UR QL AUTO: NEGATIVE
SODIUM SERPL-SCNC: 138 MMOL/L (ref 135–145)
SP GR UR STRIP.AUTO: 1.02
TRIGL SERPL-MCNC: 99 MG/DL (ref 0–149)
TSH SERPL DL<=0.005 MIU/L-ACNC: 2.97 UIU/ML (ref 0.38–5.33)
UROBILINOGEN UR STRIP.AUTO-MCNC: 0.2 MG/DL
WBC # BLD AUTO: 4.7 K/UL (ref 4.8–10.8)
WBC #/AREA URNS HPF: ABNORMAL /HPF

## 2022-06-09 PROCEDURE — 82570 ASSAY OF URINE CREATININE: CPT

## 2022-06-09 PROCEDURE — 82043 UR ALBUMIN QUANTITATIVE: CPT

## 2022-06-09 PROCEDURE — 81001 URINALYSIS AUTO W/SCOPE: CPT

## 2022-06-09 PROCEDURE — 80053 COMPREHEN METABOLIC PANEL: CPT

## 2022-06-09 PROCEDURE — 82306 VITAMIN D 25 HYDROXY: CPT

## 2022-06-09 PROCEDURE — 85025 COMPLETE CBC W/AUTO DIFF WBC: CPT

## 2022-06-09 PROCEDURE — 36415 COLL VENOUS BLD VENIPUNCTURE: CPT

## 2022-06-09 PROCEDURE — 83036 HEMOGLOBIN GLYCOSYLATED A1C: CPT

## 2022-06-09 PROCEDURE — 84443 ASSAY THYROID STIM HORMONE: CPT

## 2022-06-09 PROCEDURE — 80061 LIPID PANEL: CPT

## 2022-06-10 ENCOUNTER — TELEPHONE (OUTPATIENT)
Dept: MEDICAL GROUP | Facility: MEDICAL CENTER | Age: 62
End: 2022-06-10
Payer: OTHER GOVERNMENT

## 2022-06-11 NOTE — TELEPHONE ENCOUNTER
Called the patient left a message, please notify her that her test shows:  (1) normal liver function, kidney function, and blood sugar  (2) her vitamin D level is normal  (3) her thyroid blood test is normal  (4) her cholesterol is much worse this year compared to last year, her total is 279 compared to 211 last year, her bad cholesterol is 192 while last year it was 130, our goal especially with high blood pressure is less than 100.  I would recommend consider starting a cholesterol medication to decrease her risk of heart disease.  (5) if she is interested please let me know

## 2022-06-13 ENCOUNTER — TELEPHONE (OUTPATIENT)
Dept: MEDICAL GROUP | Facility: MEDICAL CENTER | Age: 62
End: 2022-06-13
Payer: OTHER GOVERNMENT

## 2022-06-13 DIAGNOSIS — E78.5 DYSLIPIDEMIA: Chronic | ICD-10-CM

## 2022-06-13 RX ORDER — ATORVASTATIN CALCIUM 20 MG/1
20 TABLET, FILM COATED ORAL NIGHTLY
Qty: 30 TABLET | Refills: 1 | Status: SHIPPED | OUTPATIENT
Start: 2022-06-13 | End: 2022-07-06

## 2022-06-13 NOTE — TELEPHONE ENCOUNTER
----- Message from Master Dash M.D. sent at 6/10/2022  5:49 PM PDT -----  Called the patient left a message, please notify her that her test shows:  (1) normal liver function, kidney function, and blood sugar  (2) her vitamin D level is normal  (3) her thyroid blood test is normal  (4) her cholesterol is much worse this year compared to last year, her total is 279 compared to 211 last year, her bad cholesterol is 192 while last year it was 130, our goal especially with high blood pressure is less than 100.  I would recommend consider starting a cholesterol medication to decrease her risk of heart disease.  (5) if she is interested please let me know

## 2022-06-14 ENCOUNTER — OFFICE VISIT (OUTPATIENT)
Dept: MEDICAL GROUP | Facility: MEDICAL CENTER | Age: 62
End: 2022-06-14
Payer: OTHER GOVERNMENT

## 2022-06-14 VITALS
SYSTOLIC BLOOD PRESSURE: 166 MMHG | BODY MASS INDEX: 23.49 KG/M2 | WEIGHT: 141 LBS | DIASTOLIC BLOOD PRESSURE: 98 MMHG | HEIGHT: 65 IN | TEMPERATURE: 98.2 F | OXYGEN SATURATION: 98 % | HEART RATE: 77 BPM

## 2022-06-14 DIAGNOSIS — E78.5 DYSLIPIDEMIA: ICD-10-CM

## 2022-06-14 PROCEDURE — 99213 OFFICE O/P EST LOW 20 MIN: CPT | Performed by: INTERNAL MEDICINE

## 2022-06-14 ASSESSMENT — FIBROSIS 4 INDEX: FIB4 SCORE: 1.62

## 2022-06-14 NOTE — PROGRESS NOTES
Subjective     Autumn Juárez is a 61 y.o. female who presents with Follow-Up (Hypertension)            HPI       Patient here for follow-up blood pressure, has been checking her blood pressures at home since April when she started losartan 25 mg, continues on Metroprolol 25 mg daily.  Still follows a good nutrition program, exercising regularly.  Checking blood pressures twice a day, her systolics have been running 120s to 130 with the occasional 140, diastolic 70-80 on the losartan 25 mg and metoprolol 25 mg.  She does have a component of whitecoat hypertension.  Recent labs show dyslipidemia, on 6/8 cholesterol 279, triglycerides 99, HDL 67, , and a 10-year calculated risk using office blood pressure 8.5%.  Echocardiogram pending.  No side effects with the losartan since starting the medication, and she has always tolerated the metoprolol 25 mg.  She drinks 2 cups of coffee per day.  No sodas or energy drinks.    Current Outpatient Medications   Medication Sig Dispense Refill   • atorvastatin (LIPITOR) 20 MG Tab Take 1 Tablet by mouth every evening. 30 Tablet 1   • losartan (COZAAR) 25 MG Tab TAKE 1 TABLET BY MOUTH EVERY DAY 30 Tablet 2   • metoprolol SR (TOPROL XL) 25 MG TABLET SR 24 HR TAKE 1 TABLET BY MOUTH EVERY DAY 90 Tablet 0   • estradiol (ESTRACE) 0.1 MG/GM vaginal cream Insert 1 g into the vagina every day. Daily X 7 days then 2-3 X weekly  Indications: Vulvovaginal Atrophy 42.5 g 3   • CREATINE PO Take  by mouth.     • Multiple Vitamins-Minerals (MULTIVITAMIN PO) Take  by mouth.     • Cholecalciferol (VITAMIN D PO) Take  by mouth.     • Cetirizine HCl (ZYRTEC ALLERGY PO) Take  by mouth.     • FISH OIL by Does not apply route.     • Calcium Carbonate (CALCIUM 500 PO) Take  by mouth.       No current facility-administered medications for this visit.     abn alk phos  2/23/21 alk phos 134, bili 0.4, AST 33, ALT 26   6/11/21 alk phos 111 (), liver fraction 57 (0-94), bone fraction 54 (0-55),  AMA negative   6/8/22 alk phos 89     Dyslipidemia  8/10 chol 195,trig 63,hdl 62,ldl 120  8/11 chol 199,trig 63,hdl 54,ldl 132  5/13 chol 184,trig 56,hdl 58,ldl 115  4/18/14 chol 194,trig 71,hdl 60,ldl 120  5/5/14 chol 207,trig 124,hdl 57,ldl 125,LDL-P 1380,HDL-P 34,LP-IR <25; 10 year risk 2.1%  8/31/15 chol 201,trig 154,hdl 49,ldl 121  9/17/16 chol 197,trig 76,hdl 68,ldl 114  9/5/17 chol 216,trig 80,hdl 59,ldl 141  10/11/19 chol 225,trig 94,hdl 58,ldl 148; 10 year risk 5.9%   2/23/21 chol 211,trig 86,hdl 66,ldl 130; 10 year risk 4.75%  6/8/22 chol 279,trig 99,hdl 67,ldl 192; 10 year risk 8.5%     history of tonsiillar hypertrophy  12/10 ENT  note nasopharyngoscopy tonsillar hypertrophy, possible lingual tonsillitis, could not rule out underlying mass, will do course of abx and repeat nasopharyngoscopy an MRI tongue base to be done afterwards and  1/11 dr.van garrison note ENT repeat nasopharyngoscopy showed improved tonsillar hypertrophy and tonsillitis after antibiotics      Hypertension  4/28/14 on toprol and start asa  5/2/14 urine mac <0.5 on toprol 25 mg  9/17/16 urine mac <0.7 on toprol 25 mg   1/12/18 urine mac <0.7 on toprol 25 mg  10/8/19 echo normal LV function, EF 65%, no evidence of LVH continue to monitor home blood pressures on toprol notify us if systolic above 140 consistently  10/11/19 renin 0.5 and aldosterone 11.0, plasma metanephrine and normetanephrine normal on toprol 25 mg  2/23/21 urine mac<3.0 on toprol 25 mg  4/14/22 add losartan 25 mg to toprol 25 mg  6/8/22 urine mac<1.2 on losartan 25 mg and toprol 25 mg     osteopenia  2/17/20 dexa LS-1.9,hip-2.1  2/23/21 vit d 96 on 5000 daily, change to qod  5/17/22 dexa LS-1.5,hip-2.1   6/8/22 vit d 81     Preventative health  10/18/12 tdap  6/6/14 colon by [facility] diverticulosis  10/7/19 cologuard negative  10/11/19 hep c ab negative  12/5/19 shingrix second   2/16/21 sonocine negative  2/23/21 A1c 5.5%  4/15/21 covid pfizer second   10/19/21  "[facility] note pap smear negative, repeat one more time in 5 years  10/26/21 mammogram heterogeneously dense breast tissue recommend screening breast ultrasound  6/8/22 vit d 81     Social anxiety disorder  11/14/14 social anxiety related to  who passed away being controlling, referral to behavioral health  12/17/14 behavioral health note     vaginal atrophy  11/17/20 topical HRT per gynecology                 Patient Active Problem List   Diagnosis   • Hypertension   • Preventative health care   • Dyslipidemia   • History of tonsillar hypertrophy   • Social anxiety disorder   • Dry eye syndrome   • Vaginal atrophy   • Osteopenia   • Abnormal alkaline phosphatase test        Patient Care Team:  Master Dash M.D. as PCP - General      ROS           Objective     BP (!) 166/98 (BP Location: Right arm, Patient Position: Sitting, BP Cuff Size: Adult)   Pulse 77   Temp 36.8 °C (98.2 °F)   Ht 1.651 m (5' 5\")   Wt 64 kg (141 lb)   LMP 06/01/2011   SpO2 98%   BMI 23.46 kg/m²      Physical Exam  Vitals and nursing note reviewed.   Constitutional:       Appearance: Normal appearance.   HENT:      Head: Normocephalic and atraumatic.      Right Ear: External ear normal.      Left Ear: External ear normal.   Eyes:      Conjunctiva/sclera: Conjunctivae normal.   Cardiovascular:      Rate and Rhythm: Normal rate and regular rhythm.      Heart sounds: Normal heart sounds.   Pulmonary:      Effort: Pulmonary effort is normal.      Breath sounds: Normal breath sounds.   Abdominal:      General: There is no distension.   Skin:     General: Skin is warm.   Neurological:      General: No focal deficit present.      Mental Status: She is alert.   Psychiatric:         Mood and Affect: Mood normal.         Behavior: Behavior normal.              Assessment & Plan        Assessment  #1 hypertension, whitecoat component in the office, home blood pressure readings actually look very good on the losartan 25 mg " metoprolol 25 mg combination, blood pressures running 120s to 130s systolic over 70-80 diastolic without side effects on the medications, she has been diligently checking her blood pressure for the past 2 months twice a day, echo has been ordered    #2 dyslipidemia 6/8/22 chol 279,trig 99,hdl 67,ldl 192; 10 year risk 8.5% on no medications         Plan  #1 reviewed laboratory test with her, 10-year cardiac risk using office blood pressure 8.5%, using home blood pressures 10-year risk 5%, we discussed the risks and benefits of cholesterol medication, we will start atorvastatin 20 mg daily.  Based upon her home blood pressure readings, I would not adjust the losartan or metoprolol because of her elevated office blood pressure as she has a component of whitecoat hypertension and her office numbers are always elevated, we will check an echo to see if there is LVH.  She has done a great job of checking her home blood pressures twice a day for the last 2 months and these are well controlled demonstrating the whitecoat component.  Given these normal home readings on the losartan and Metroprolol no adjustments need to be made with her blood pressure medication as she gets anxious in the clinic setting which contributes to her elevated blood pressure.    #2 start Lipitor 20 mg daily may cause muscle pain, joint pain, liver enzyme elevation, recent liver function test normal    #3 repeat lipid panel and liver enzymes in 4 weeks, lab slip provided    #4 continue good nutrition, diet, exercise program    #5 follow-up 6 months    #6 reviewed labs with her from June 8, reviewed 10-year cardiac risk calculation with her

## 2022-06-14 NOTE — TELEPHONE ENCOUNTER
Thank you for the notification, please notify the patient that I will send a prescription for atorvastatin to once a day cholesterol pill that she can take.  The main side effects sometimes can be muscle aches or joint pain and if she experiences any of that out of the ordinary to let me know.  Rarely the medication can cause irritation of the liver although her recent liver tests were normal.    We will need to repeat her blood test fasting in 4 weeks, I will print that order and mail that to her.  The prescription for the medication has been sent to [facility].

## 2022-06-28 ENCOUNTER — TELEPHONE (OUTPATIENT)
Dept: MEDICAL GROUP | Facility: MEDICAL CENTER | Age: 62
End: 2022-06-28

## 2022-06-28 ENCOUNTER — HOSPITAL ENCOUNTER (OUTPATIENT)
Dept: CARDIOLOGY | Facility: MEDICAL CENTER | Age: 62
End: 2022-06-28
Attending: INTERNAL MEDICINE
Payer: OTHER GOVERNMENT

## 2022-06-28 DIAGNOSIS — I10 PRIMARY HYPERTENSION: ICD-10-CM

## 2022-06-28 LAB
LV EJECT FRACT  99904: 65
LV EJECT FRACT MOD 2C 99903: 70.71
LV EJECT FRACT MOD 4C 99902: 62.24
LV EJECT FRACT MOD BP 99901: 66.94

## 2022-06-28 PROCEDURE — 93306 TTE W/DOPPLER COMPLETE: CPT

## 2022-06-28 PROCEDURE — 93306 TTE W/DOPPLER COMPLETE: CPT | Mod: 26 | Performed by: INTERNAL MEDICINE

## 2022-06-28 NOTE — TELEPHONE ENCOUNTER
Called the patient and left message, please notify her that her heart ultrasound shows normal heart muscle size and function.  This is very good news, have her continue on her blood pressure medication, continue to check her blood pressures regularly, and continue her good nutrition and exercise program.

## 2022-06-29 ENCOUNTER — TELEPHONE (OUTPATIENT)
Dept: MEDICAL GROUP | Facility: MEDICAL CENTER | Age: 62
End: 2022-06-29
Payer: OTHER GOVERNMENT

## 2022-06-29 NOTE — TELEPHONE ENCOUNTER
----- Message from Master Dash M.D. sent at 6/28/2022  4:24 PM PDT -----  Called the patient and left message, please notify her that her heart ultrasound shows normal heart muscle size and function.  This is very good news, have her continue on her blood pressure medication, continue to check her blood pressures regularly, and continue her good nutrition and exercise program.  
Pt informed.     
normal (ped)...

## 2022-07-13 ENCOUNTER — HOSPITAL ENCOUNTER (OUTPATIENT)
Dept: LAB | Facility: MEDICAL CENTER | Age: 62
End: 2022-07-13
Attending: INTERNAL MEDICINE
Payer: OTHER GOVERNMENT

## 2022-07-13 ENCOUNTER — TELEPHONE (OUTPATIENT)
Dept: MEDICAL GROUP | Facility: MEDICAL CENTER | Age: 62
End: 2022-07-13

## 2022-07-13 DIAGNOSIS — E78.5 DYSLIPIDEMIA: ICD-10-CM

## 2022-07-13 DIAGNOSIS — E78.5 DYSLIPIDEMIA: Chronic | ICD-10-CM

## 2022-07-13 LAB
ALBUMIN SERPL BCP-MCNC: 4.5 G/DL (ref 3.2–4.9)
ALP SERPL-CCNC: 94 U/L (ref 30–99)
ALT SERPL-CCNC: 26 U/L (ref 2–50)
AST SERPL-CCNC: 35 U/L (ref 12–45)
BILIRUB CONJ SERPL-MCNC: <0.2 MG/DL (ref 0.1–0.5)
BILIRUB INDIRECT SERPL-MCNC: NORMAL MG/DL (ref 0–1)
BILIRUB SERPL-MCNC: 0.5 MG/DL (ref 0.1–1.5)
CHOLEST SERPL-MCNC: 159 MG/DL (ref 100–199)
FASTING STATUS PATIENT QL REPORTED: NORMAL
HDLC SERPL-MCNC: 56 MG/DL
LDLC SERPL CALC-MCNC: 87 MG/DL
PROT SERPL-MCNC: 6.8 G/DL (ref 6–8.2)
TRIGL SERPL-MCNC: 78 MG/DL (ref 0–149)

## 2022-07-13 PROCEDURE — 80076 HEPATIC FUNCTION PANEL: CPT

## 2022-07-13 PROCEDURE — 36415 COLL VENOUS BLD VENIPUNCTURE: CPT

## 2022-07-13 PROCEDURE — 80061 LIPID PANEL: CPT

## 2022-07-13 RX ORDER — ATORVASTATIN CALCIUM 20 MG/1
20 TABLET, FILM COATED ORAL EVERY EVENING
Qty: 90 TABLET | Refills: 3 | Status: SHIPPED | OUTPATIENT
Start: 2022-07-13 | End: 2023-07-22

## 2022-07-14 NOTE — TELEPHONE ENCOUNTER
Notified with labs, cholesterol is excellent with atorvastatin, total cholesterol and LDL improved significantly.  No side effects, liver enzymes normal, will send refill of the atorvastatin 20 mg to [facility] 90 days at a time, she will continue her good nutrition and exercise program.

## 2022-12-15 ENCOUNTER — OFFICE VISIT (OUTPATIENT)
Dept: MEDICAL GROUP | Facility: MEDICAL CENTER | Age: 62
End: 2022-12-15
Payer: OTHER GOVERNMENT

## 2022-12-15 VITALS
OXYGEN SATURATION: 96 % | SYSTOLIC BLOOD PRESSURE: 166 MMHG | BODY MASS INDEX: 23.95 KG/M2 | HEIGHT: 66 IN | WEIGHT: 149 LBS | DIASTOLIC BLOOD PRESSURE: 98 MMHG | HEART RATE: 68 BPM | TEMPERATURE: 97.7 F

## 2022-12-15 DIAGNOSIS — Z00.00 PREVENTATIVE HEALTH CARE: ICD-10-CM

## 2022-12-15 DIAGNOSIS — Z23 NEED FOR DIPHTHERIA-TETANUS-PERTUSSIS (TDAP) VACCINE: ICD-10-CM

## 2022-12-15 DIAGNOSIS — Z12.11 COLON CANCER SCREENING: ICD-10-CM

## 2022-12-15 DIAGNOSIS — Z23 NEEDS FLU SHOT: ICD-10-CM

## 2022-12-15 DIAGNOSIS — N95.2 ATROPHIC VAGINITIS: ICD-10-CM

## 2022-12-15 DIAGNOSIS — Z12.31 ENCOUNTER FOR SCREENING MAMMOGRAM FOR BREAST CANCER: ICD-10-CM

## 2022-12-15 DIAGNOSIS — N95.2 VAGINAL ATROPHY: ICD-10-CM

## 2022-12-15 PROCEDURE — 90471 IMMUNIZATION ADMIN: CPT | Performed by: INTERNAL MEDICINE

## 2022-12-15 PROCEDURE — 90715 TDAP VACCINE 7 YRS/> IM: CPT | Performed by: INTERNAL MEDICINE

## 2022-12-15 PROCEDURE — 99396 PREV VISIT EST AGE 40-64: CPT | Mod: 25 | Performed by: INTERNAL MEDICINE

## 2022-12-15 PROCEDURE — 90686 IIV4 VACC NO PRSV 0.5 ML IM: CPT | Performed by: INTERNAL MEDICINE

## 2022-12-15 PROCEDURE — 90472 IMMUNIZATION ADMIN EACH ADD: CPT | Performed by: INTERNAL MEDICINE

## 2022-12-15 RX ORDER — ESTRADIOL 0.1 MG/G
1 CREAM VAGINAL DAILY
Qty: 42.5 G | Refills: 3 | Status: SHIPPED | OUTPATIENT
Start: 2022-12-15

## 2022-12-15 ASSESSMENT — FIBROSIS 4 INDEX: FIB4 SCORE: 1.92

## 2022-12-30 ENCOUNTER — APPOINTMENT (OUTPATIENT)
Dept: RADIOLOGY | Facility: MEDICAL CENTER | Age: 62
End: 2022-12-30
Attending: INTERNAL MEDICINE
Payer: OTHER GOVERNMENT

## 2023-01-03 ENCOUNTER — APPOINTMENT (OUTPATIENT)
Dept: RADIOLOGY | Facility: MEDICAL CENTER | Age: 63
End: 2023-01-03
Attending: INTERNAL MEDICINE
Payer: OTHER GOVERNMENT

## 2023-01-10 ENCOUNTER — APPOINTMENT (OUTPATIENT)
Dept: RADIOLOGY | Facility: MEDICAL CENTER | Age: 63
End: 2023-01-10
Attending: INTERNAL MEDICINE
Payer: OTHER GOVERNMENT

## 2023-01-17 ENCOUNTER — TELEPHONE (OUTPATIENT)
Dept: MEDICAL GROUP | Facility: MEDICAL CENTER | Age: 63
End: 2023-01-17
Payer: OTHER GOVERNMENT

## 2023-01-17 ENCOUNTER — HOSPITAL ENCOUNTER (OUTPATIENT)
Dept: RADIOLOGY | Facility: MEDICAL CENTER | Age: 63
End: 2023-01-17
Attending: INTERNAL MEDICINE
Payer: OTHER GOVERNMENT

## 2023-01-17 DIAGNOSIS — Z12.31 ENCOUNTER FOR SCREENING MAMMOGRAM FOR BREAST CANCER: ICD-10-CM

## 2023-01-17 PROCEDURE — 77063 BREAST TOMOSYNTHESIS BI: CPT

## 2023-01-17 NOTE — TELEPHONE ENCOUNTER
Please notify the patient that:  (1) her mammogram is negative but does show dense breast tissue which may decrease the accuracy of the mammogram  (2) she may obtain a screening breast ultrasound which could provide further detail  (3) [facility] no longer offers the screening breast ultrasound, thus if she is interested in having that done, she can have that at the [facility] if that imaging center is covered under her insurance plan  (3) although her insurance may not cover the screening breast ultrasound, if that is the case, then the out of pocket cost is approximately $125  (4) please let me know if she is interested in obtaining the screening breast ultrasound as we would need to send the order to [facility]

## 2023-01-18 ENCOUNTER — TELEPHONE (OUTPATIENT)
Dept: MEDICAL GROUP | Facility: MEDICAL CENTER | Age: 63
End: 2023-01-18
Payer: OTHER GOVERNMENT

## 2023-01-18 NOTE — TELEPHONE ENCOUNTER
----- Message from Master Dash M.D. sent at 1/17/2023  2:50 PM PST -----  Please notify the patient that:  (1) her mammogram is negative but does show dense breast tissue which may decrease the accuracy of the mammogram  (2) she may obtain a screening breast ultrasound which could provide further detail  (3) [facility] no longer offers the screening breast ultrasound, thus if she is interested in having that done, she can have that at the [facility] if that imaging center is covered under her insurance plan  (3) although her insurance may not cover the screening breast ultrasound, if that is the case, then the out of pocket cost is approximately $125  (4) please let me know if she is interested in obtaining the screening breast ultrasound as we would need to send the order to [facility]

## 2023-02-01 ENCOUNTER — TELEPHONE (OUTPATIENT)
Dept: MEDICAL GROUP | Facility: MEDICAL CENTER | Age: 63
End: 2023-02-01
Payer: OTHER GOVERNMENT

## 2023-02-02 ENCOUNTER — TELEPHONE (OUTPATIENT)
Dept: MEDICAL GROUP | Facility: MEDICAL CENTER | Age: 63
End: 2023-02-02
Payer: OTHER GOVERNMENT

## 2023-02-02 NOTE — TELEPHONE ENCOUNTER
----- Message from Master Dash M.D. sent at 2/1/2023  2:16 AM PST -----  Please notify the patient that her Cologuard stool test is negative

## 2023-04-24 ENCOUNTER — GYNECOLOGY VISIT (OUTPATIENT)
Dept: OBGYN | Facility: CLINIC | Age: 63
End: 2023-04-24
Payer: OTHER GOVERNMENT

## 2023-04-24 VITALS
BODY MASS INDEX: 24.11 KG/M2 | HEIGHT: 66 IN | SYSTOLIC BLOOD PRESSURE: 162 MMHG | DIASTOLIC BLOOD PRESSURE: 80 MMHG | WEIGHT: 150 LBS

## 2023-04-24 DIAGNOSIS — Z01.419 WELL WOMAN EXAM WITH ROUTINE GYNECOLOGICAL EXAM: ICD-10-CM

## 2023-04-24 PROCEDURE — 99396 PREV VISIT EST AGE 40-64: CPT | Performed by: OBSTETRICS & GYNECOLOGY

## 2023-04-24 ASSESSMENT — FIBROSIS 4 INDEX: FIB4 SCORE: 1.92

## 2023-04-24 NOTE — PROGRESS NOTES
Gynecology Annual    Autumn Graciela Juárez    62 y.o.    Chief complaint    Chief Complaint   Patient presents with    Gynecologic Exam       HPI    Patient is a 63 yo  who presents for gyn annual exam.   She has no concerns today, overall feels well.   Denies pelvic pain, abnormal discharge or changes in bowel/bladder.   No vaginal bleeding.   Sexually active, no issues. Uses vaginal estrogen cream for dryness.   Competitively lifts weights.       Review of Systems:  Review of Systems   All other systems reviewed and are negative.     Past Obstetrical History:     x 3  EAB x 1    Past Gynecological History:    Last pap:10/21/21 pap NILM  H/o abnormal pap: yes, hx HPV  H/o STIs: No  DEXA: N/A  Colonoscopy: Cologard screening negative 23  Last Mammogram: 23 - BIRADS 1   LMP: Patient's last menstrual period was 2011.  BCM: Postmenopausal    Past Medical History    Past Medical History:   Diagnosis Date    Osteopenia        Past Surgical History    Past Surgical History:   Procedure Laterality Date    DENTAL EXTRACTION(S)      TONSILLECTOMY         Family History   Problem Relation Age of Onset    Diabetes Mother     Dementia Mother     Cancer Father         mengioma       Allergies    No Known Allergies    Medications    Current Outpatient Medications   Medication Sig Dispense Refill    metoprolol SR (TOPROL XL) 25 MG TABLET SR 24 HR TAKE 1 TABLET BY MOUTH EVERY DAY 90 Tablet 2    estradiol (ESTRACE) 0.1 MG/GM vaginal cream Insert 1 g into the vagina every day. Daily X 7 days then 2-3 X weekly  Indications: Vulvovaginal Atrophy 42.5 g 3    atorvastatin (LIPITOR) 20 MG Tab Take 1 Tablet by mouth every evening. 90 Tablet 3    losartan (COZAAR) 25 MG Tab TAKE 1 TABLET BY MOUTH EVERY DAY 90 Tablet 3    CREATINE PO Take  by mouth.      Multiple Vitamins-Minerals (MULTIVITAMIN PO) Take  by mouth.      Cholecalciferol (VITAMIN D PO) Take  by mouth.      Cetirizine HCl (ZYRTEC ALLERGY PO) Take  by mouth.  "     FISH OIL by Does not apply route.      Calcium Carbonate (CALCIUM 500 PO) Take  by mouth.       No current facility-administered medications for this visit.       Social    Social History     Tobacco Use    Smoking status: Never    Smokeless tobacco: Never   Vaping Use    Vaping Use: Never used   Substance Use Topics    Alcohol use: Not Currently     Alcohol/week: 0.0 oz     Comment: occ    Drug use: No        OBJECTIVE:    Vitals    BP (!) 162/80 (BP Location: Left arm, Patient Position: Sitting, BP Cuff Size: Adult)   Ht 5' 6\"   Wt 150 lb   LMP 2011   BMI 24.21 kg/m²     Physical Exam    GENERAL: Well developed, well nourished, female in no acute distress.    HEENT: NCAT, mucus membranes moist    Neck: Supple, nontender, no R[place], no thyromegaly    Breasts: Symmetric, Nontender, no masses, no nipple discharge, no skin changes    CV: RRR    Pulm: CTAB    Abdomen: Soft ND NT.    Ext: LOZANO    : Normal Vulva, vagina. No lesions present. No abnormal discharge. No blood.    Urethral meatus normal.    Cervix smooth pink pale atrophic no lesions, discharge or blood.     Uterus small midline AV mobile nontender    Adnexa no masses or tenderness    Labs/Pathology:    none    A/P    Patient Active Problem List   Diagnosis    Hypertension    Preventative health care    Dyslipidemia    History of tonsillar hypertrophy    Dry eye syndrome    Vaginal atrophy    Osteopenia    Abnormal alkaline phosphatase test       Autumn Juárez    62 y.o.        1. Well woman exam with routine gynecological exam    Pap up to date per current guidelines. Encouraged yearly pelvic and breast exam. Monthly self breast exam encouraged. Up to date with breast cancer screening, colon cancer screening and PCP routine health maintenance. Encouraged to continue healthy diet, exercise and lifestyle.     RTC in 1 year or PRN.         Time spent: 20 minutes      Georgia Eaton M.D.    Obstetrics and Gynecology    :46 PM   "

## 2023-04-28 ENCOUNTER — TELEPHONE (OUTPATIENT)
Dept: MEDICAL GROUP | Facility: MEDICAL CENTER | Age: 63
End: 2023-04-28
Payer: OTHER GOVERNMENT

## 2023-04-28 ENCOUNTER — HOSPITAL ENCOUNTER (OUTPATIENT)
Dept: LAB | Facility: MEDICAL CENTER | Age: 63
End: 2023-04-28
Attending: INTERNAL MEDICINE
Payer: OTHER GOVERNMENT

## 2023-04-28 DIAGNOSIS — I10 PRIMARY HYPERTENSION: Chronic | ICD-10-CM

## 2023-04-28 DIAGNOSIS — Z00.00 PREVENTATIVE HEALTH CARE: ICD-10-CM

## 2023-04-28 DIAGNOSIS — R73.09 IMPAIRED GLUCOSE METABOLISM: ICD-10-CM

## 2023-04-28 DIAGNOSIS — E78.5 DYSLIPIDEMIA: Chronic | ICD-10-CM

## 2023-04-28 PROBLEM — R74.8 ABNORMAL ALKALINE PHOSPHATASE TEST: Status: RESOLVED | Noted: 2021-06-15 | Resolved: 2023-04-28

## 2023-04-28 LAB
25(OH)D3 SERPL-MCNC: 63 NG/ML (ref 30–100)
ALBUMIN SERPL BCP-MCNC: 4.4 G/DL (ref 3.2–4.9)
ALBUMIN/GLOB SERPL: 1.7 G/DL
ALP SERPL-CCNC: 98 U/L (ref 30–99)
ALT SERPL-CCNC: 24 U/L (ref 2–50)
ANION GAP SERPL CALC-SCNC: 9 MMOL/L (ref 7–16)
APPEARANCE UR: CLEAR
AST SERPL-CCNC: 23 U/L (ref 12–45)
BASOPHILS # BLD AUTO: 1.1 % (ref 0–1.8)
BASOPHILS # BLD: 0.06 K/UL (ref 0–0.12)
BILIRUB SERPL-MCNC: 0.5 MG/DL (ref 0.1–1.5)
BILIRUB UR QL STRIP.AUTO: NEGATIVE
BUN SERPL-MCNC: 25 MG/DL (ref 8–22)
CALCIUM ALBUM COR SERPL-MCNC: 9.3 MG/DL (ref 8.5–10.5)
CALCIUM SERPL-MCNC: 9.6 MG/DL (ref 8.5–10.5)
CHLORIDE SERPL-SCNC: 104 MMOL/L (ref 96–112)
CHOLEST SERPL-MCNC: 222 MG/DL (ref 100–199)
CO2 SERPL-SCNC: 25 MMOL/L (ref 20–33)
COLOR UR: YELLOW
CREAT SERPL-MCNC: 1.05 MG/DL (ref 0.5–1.4)
EOSINOPHIL # BLD AUTO: 0.1 K/UL (ref 0–0.51)
EOSINOPHIL NFR BLD: 1.8 % (ref 0–6.9)
ERYTHROCYTE [DISTWIDTH] IN BLOOD BY AUTOMATED COUNT: 44.3 FL (ref 35.9–50)
EST. AVERAGE GLUCOSE BLD GHB EST-MCNC: 120 MG/DL
FASTING STATUS PATIENT QL REPORTED: NORMAL
GFR SERPLBLD CREATININE-BSD FMLA CKD-EPI: 60 ML/MIN/1.73 M 2
GLOBULIN SER CALC-MCNC: 2.6 G/DL (ref 1.9–3.5)
GLUCOSE SERPL-MCNC: 92 MG/DL (ref 65–99)
GLUCOSE UR STRIP.AUTO-MCNC: NEGATIVE MG/DL
HBA1C MFR BLD: 5.8 % (ref 4–5.6)
HCT VFR BLD AUTO: 48.2 % (ref 37–47)
HDLC SERPL-MCNC: 64 MG/DL
HGB BLD-MCNC: 15.6 G/DL (ref 12–16)
IMM GRANULOCYTES # BLD AUTO: 0.01 K/UL (ref 0–0.11)
IMM GRANULOCYTES NFR BLD AUTO: 0.2 % (ref 0–0.9)
KETONES UR STRIP.AUTO-MCNC: NEGATIVE MG/DL
LDLC SERPL CALC-MCNC: 141 MG/DL
LEUKOCYTE ESTERASE UR QL STRIP.AUTO: NEGATIVE
LYMPHOCYTES # BLD AUTO: 1.9 K/UL (ref 1–4.8)
LYMPHOCYTES NFR BLD: 34.7 % (ref 22–41)
MCH RBC QN AUTO: 31.2 PG (ref 27–33)
MCHC RBC AUTO-ENTMCNC: 32.4 G/DL (ref 33.6–35)
MCV RBC AUTO: 96.4 FL (ref 81.4–97.8)
MICRO URNS: NORMAL
MONOCYTES # BLD AUTO: 0.53 K/UL (ref 0–0.85)
MONOCYTES NFR BLD AUTO: 9.7 % (ref 0–13.4)
NEUTROPHILS # BLD AUTO: 2.88 K/UL (ref 2–7.15)
NEUTROPHILS NFR BLD: 52.5 % (ref 44–72)
NITRITE UR QL STRIP.AUTO: NEGATIVE
NRBC # BLD AUTO: 0 K/UL
NRBC BLD-RTO: 0 /100 WBC
PH UR STRIP.AUTO: 7 [PH] (ref 5–8)
PLATELET # BLD AUTO: 212 K/UL (ref 164–446)
PMV BLD AUTO: 10.2 FL (ref 9–12.9)
POTASSIUM SERPL-SCNC: 4.5 MMOL/L (ref 3.6–5.5)
PROT SERPL-MCNC: 7 G/DL (ref 6–8.2)
PROT UR QL STRIP: NEGATIVE MG/DL
RBC # BLD AUTO: 5 M/UL (ref 4.2–5.4)
RBC UR QL AUTO: NEGATIVE
SODIUM SERPL-SCNC: 138 MMOL/L (ref 135–145)
SP GR UR STRIP.AUTO: 1.01
TRIGL SERPL-MCNC: 85 MG/DL (ref 0–149)
TSH SERPL DL<=0.005 MIU/L-ACNC: 2.49 UIU/ML (ref 0.38–5.33)
UROBILINOGEN UR STRIP.AUTO-MCNC: 0.2 MG/DL
WBC # BLD AUTO: 5.5 K/UL (ref 4.8–10.8)

## 2023-04-28 PROCEDURE — 82043 UR ALBUMIN QUANTITATIVE: CPT

## 2023-04-28 PROCEDURE — 80053 COMPREHEN METABOLIC PANEL: CPT

## 2023-04-28 PROCEDURE — 36415 COLL VENOUS BLD VENIPUNCTURE: CPT

## 2023-04-28 PROCEDURE — 85025 COMPLETE CBC W/AUTO DIFF WBC: CPT

## 2023-04-28 PROCEDURE — 83036 HEMOGLOBIN GLYCOSYLATED A1C: CPT

## 2023-04-28 PROCEDURE — 84443 ASSAY THYROID STIM HORMONE: CPT

## 2023-04-28 PROCEDURE — 82306 VITAMIN D 25 HYDROXY: CPT

## 2023-04-28 PROCEDURE — 82570 ASSAY OF URINE CREATININE: CPT

## 2023-04-28 PROCEDURE — 81003 URINALYSIS AUTO W/O SCOPE: CPT

## 2023-04-28 PROCEDURE — 80061 LIPID PANEL: CPT

## 2023-04-29 LAB
CREAT UR-MCNC: 36.1 MG/DL
MICROALBUMIN UR-MCNC: <1.2 MG/DL
MICROALBUMIN/CREAT UR: NORMAL MG/G (ref 0–30)

## 2023-04-29 NOTE — TELEPHONE ENCOUNTER
Notified with labs, A1c elevated, cholesterol and LDL elevated, she tries to remember to take her cholesterol pill daily, she has gained 5 pounds over the winter but still is exercising.  No need to make any changes with her medications, try to optimize her nutrition, exercise, take the Lipitor daily, will repeat her labs in August, orders placed in the computer.

## 2023-05-23 ENCOUNTER — APPOINTMENT (RX ONLY)
Dept: URBAN - METROPOLITAN AREA CLINIC 22 | Facility: CLINIC | Age: 63
Setting detail: DERMATOLOGY
End: 2023-05-23

## 2023-05-23 DIAGNOSIS — L82.1 OTHER SEBORRHEIC KERATOSIS: ICD-10-CM

## 2023-05-23 DIAGNOSIS — D18.0 HEMANGIOMA: ICD-10-CM

## 2023-05-23 DIAGNOSIS — L81.4 OTHER MELANIN HYPERPIGMENTATION: ICD-10-CM

## 2023-05-23 DIAGNOSIS — D22 MELANOCYTIC NEVI: ICD-10-CM

## 2023-05-23 DIAGNOSIS — Z71.89 OTHER SPECIFIED COUNSELING: ICD-10-CM

## 2023-05-23 DIAGNOSIS — L57.0 ACTINIC KERATOSIS: ICD-10-CM

## 2023-05-23 PROBLEM — D22.5 MELANOCYTIC NEVI OF TRUNK: Status: ACTIVE | Noted: 2023-05-23

## 2023-05-23 PROBLEM — D18.01 HEMANGIOMA OF SKIN AND SUBCUTANEOUS TISSUE: Status: ACTIVE | Noted: 2023-05-23

## 2023-05-23 PROBLEM — D48.5 NEOPLASM OF UNCERTAIN BEHAVIOR OF SKIN: Status: ACTIVE | Noted: 2023-05-23

## 2023-05-23 PROCEDURE — ? LIQUID NITROGEN

## 2023-05-23 PROCEDURE — ? ADDITIONAL NOTES

## 2023-05-23 PROCEDURE — ? BIOPSY BY SHAVE METHOD

## 2023-05-23 PROCEDURE — 11102 TANGNTL BX SKIN SINGLE LES: CPT

## 2023-05-23 PROCEDURE — 99213 OFFICE O/P EST LOW 20 MIN: CPT | Mod: 25

## 2023-05-23 PROCEDURE — ? COUNSELING

## 2023-05-23 PROCEDURE — 17000 DESTRUCT PREMALG LESION: CPT | Mod: 59

## 2023-05-23 PROCEDURE — 17003 DESTRUCT PREMALG LES 2-14: CPT

## 2023-05-23 PROCEDURE — ? SUNSCREEN RECOMMENDATIONS

## 2023-05-23 ASSESSMENT — LOCATION SIMPLE DESCRIPTION DERM
LOCATION SIMPLE: LEFT ZYGOMA
LOCATION SIMPLE: LEFT UPPER BACK
LOCATION SIMPLE: ABDOMEN
LOCATION SIMPLE: RIGHT UPPER BACK
LOCATION SIMPLE: LEFT FOREARM
LOCATION SIMPLE: LEFT CHEEK
LOCATION SIMPLE: RIGHT ZYGOMA
LOCATION SIMPLE: LEFT FOREHEAD

## 2023-05-23 ASSESSMENT — LOCATION DETAILED DESCRIPTION DERM
LOCATION DETAILED: RIGHT CENTRAL ZYGOMA
LOCATION DETAILED: LEFT SUPERIOR FOREHEAD
LOCATION DETAILED: LEFT INFERIOR PREAURICULAR CHEEK
LOCATION DETAILED: RIGHT MID-UPPER BACK
LOCATION DETAILED: LEFT SUPERIOR LATERAL MALAR CHEEK
LOCATION DETAILED: LEFT LATERAL ABDOMEN
LOCATION DETAILED: LEFT CENTRAL ZYGOMA
LOCATION DETAILED: LEFT PROXIMAL DORSAL FOREARM
LOCATION DETAILED: LEFT INFERIOR UPPER BACK

## 2023-05-23 ASSESSMENT — LOCATION ZONE DERM
LOCATION ZONE: FACE
LOCATION ZONE: ARM
LOCATION ZONE: TRUNK

## 2023-05-23 NOTE — PROCEDURE: BIOPSY BY SHAVE METHOD
Detail Level: Detailed
Depth Of Biopsy: dermis
Was A Bandage Applied: Yes
Size Of Lesion In Cm: 0.6
X Size Of Lesion In Cm: 0.7
Biopsy Type: H and E
Biopsy Method: Dermablade
Anesthesia Type: 0.05% lidocaine without epinephrine
Anesthesia Volume In Cc: 0.5
Additional Anesthesia Volume In Cc (Will Not Render If 0): 0
Hemostasis: Drysol
Wound Care: Petrolatum
Dressing: bandage
Destruction After The Procedure: No
Type Of Destruction Used: Curettage
Curettage Text: The wound bed was treated with curettage after the biopsy was performed.
Cryotherapy Text: The wound bed was treated with cryotherapy after the biopsy was performed.
Electrodesiccation Text: The wound bed was treated with electrodesiccation after the biopsy was performed.
Electrodesiccation And Curettage Text: The wound bed was treated with electrodesiccation and curettage after the biopsy was performed.
Silver Nitrate Text: The wound bed was treated with silver nitrate after the biopsy was performed.
Lab: [id number]
Lab Facility: 
Consent: Written consent was obtained and risks were reviewed including but not limited to scarring, infection, bleeding, scabbing, incomplete removal, nerve damage and allergy to anesthesia.
Post-Care Instructions: I reviewed with the patient in detail post-care instructions. Patient is to keep the biopsy site dry overnight, and then apply bacitracin twice daily until healed. Patient may apply hydrogen peroxide soaks to remove any crusting.
Notification Instructions: Patient will be notified of biopsy results. However, patient instructed to call the office if not contacted within 2 weeks.
Billing Type: Third-Party Bill
Information: Selecting Yes will display possible errors in your note based on the variables you have selected. This validation is only offered as a suggestion for you. PLEASE NOTE THAT THE VALIDATION TEXT WILL BE REMOVED WHEN YOU FINALIZE YOUR NOTE. IF YOU WANT TO FAX A PRELIMINARY NOTE YOU WILL NEED TO TOGGLE THIS TO 'NO' IF YOU DO NOT WANT IT IN YOUR FAXED NOTE.

## 2023-05-23 NOTE — PROCEDURE: ADDITIONAL NOTES
Detail Level: Simple
Render Risk Assessment In Note?: no
Additional Notes: Recommend annual checks, last was 2020

## 2023-08-02 ENCOUNTER — APPOINTMENT (RX ONLY)
Dept: URBAN - METROPOLITAN AREA CLINIC 22 | Facility: CLINIC | Age: 63
Setting detail: DERMATOLOGY
End: 2023-08-02

## 2023-08-02 DIAGNOSIS — L57.0 ACTINIC KERATOSIS: ICD-10-CM

## 2023-08-02 PROCEDURE — ? DIAGNOSIS COMMENT

## 2023-08-02 PROCEDURE — ? COUNSELING

## 2023-08-02 PROCEDURE — 99213 OFFICE O/P EST LOW 20 MIN: CPT

## 2023-08-02 PROCEDURE — ? ADDITIONAL NOTES

## 2023-08-02 ASSESSMENT — LOCATION SIMPLE DESCRIPTION DERM: LOCATION SIMPLE: RIGHT CHEEK

## 2023-08-02 ASSESSMENT — LOCATION ZONE DERM: LOCATION ZONE: FACE

## 2023-08-02 ASSESSMENT — LOCATION DETAILED DESCRIPTION DERM: LOCATION DETAILED: RIGHT SUPERIOR PREAURICULAR CHEEK

## 2023-08-02 NOTE — PROCEDURE: ADDITIONAL NOTES
Render Risk Assessment In Note?: no
Detail Level: Simple
Additional Notes: NER today. Will continue to monitor\\nRecommend annual skin checks.

## 2023-08-15 ENCOUNTER — HOSPITAL ENCOUNTER (OUTPATIENT)
Dept: LAB | Facility: MEDICAL CENTER | Age: 63
End: 2023-08-15
Attending: INTERNAL MEDICINE
Payer: OTHER GOVERNMENT

## 2023-08-15 DIAGNOSIS — E78.5 DYSLIPIDEMIA: Chronic | ICD-10-CM

## 2023-08-15 DIAGNOSIS — R73.09 IMPAIRED GLUCOSE METABOLISM: ICD-10-CM

## 2023-08-15 LAB
EST. AVERAGE GLUCOSE BLD GHB EST-MCNC: 114 MG/DL
HBA1C MFR BLD: 5.6 % (ref 4–5.6)

## 2023-08-15 PROCEDURE — 80061 LIPID PANEL: CPT

## 2023-08-15 PROCEDURE — 36415 COLL VENOUS BLD VENIPUNCTURE: CPT

## 2023-08-15 PROCEDURE — 83036 HEMOGLOBIN GLYCOSYLATED A1C: CPT

## 2023-08-16 ENCOUNTER — TELEPHONE (OUTPATIENT)
Dept: MEDICAL GROUP | Facility: MEDICAL CENTER | Age: 63
End: 2023-08-16
Payer: OTHER GOVERNMENT

## 2023-08-16 PROBLEM — R73.09 IMPAIRED GLUCOSE METABOLISM: Status: ACTIVE | Noted: 2023-08-16

## 2023-08-16 LAB
CHOLEST SERPL-MCNC: 157 MG/DL (ref 100–199)
FASTING STATUS PATIENT QL REPORTED: NORMAL
HDLC SERPL-MCNC: 57 MG/DL
LDLC SERPL CALC-MCNC: 87 MG/DL
TRIGL SERPL-MCNC: 63 MG/DL (ref 0–149)

## 2023-08-17 NOTE — TELEPHONE ENCOUNTER
Notified with labs, cholesterol is excellent, continue statin therapy, continue her good nutrition and exercise program.  Cholesterol and blood sugar improved.

## 2024-01-13 ENCOUNTER — TELEPHONE (OUTPATIENT)
Dept: MEDICAL GROUP | Facility: MEDICAL CENTER | Age: 64
End: 2024-01-13
Payer: OTHER GOVERNMENT

## 2024-01-13 DIAGNOSIS — E78.5 DYSLIPIDEMIA: Chronic | ICD-10-CM

## 2024-01-13 RX ORDER — LOSARTAN POTASSIUM 25 MG/1
25 TABLET ORAL DAILY
Qty: 90 TABLET | Refills: 0 | Status: SHIPPED | OUTPATIENT
Start: 2024-01-13

## 2024-01-13 RX ORDER — ATORVASTATIN CALCIUM 20 MG/1
20 TABLET, FILM COATED ORAL EVERY EVENING
Qty: 90 TABLET | Refills: 0 | Status: SHIPPED | OUTPATIENT
Start: 2024-01-13

## 2024-01-13 NOTE — TELEPHONE ENCOUNTER
Please call the patient, she is due for a follow-up appointment, we have not seen her in over a year.

## 2024-03-17 DIAGNOSIS — I10 ESSENTIAL HYPERTENSION: ICD-10-CM

## 2024-03-17 RX ORDER — METOPROLOL SUCCINATE 25 MG/1
TABLET, EXTENDED RELEASE ORAL
Qty: 90 TABLET | Refills: 0 | Status: SHIPPED | OUTPATIENT
Start: 2024-03-17

## 2024-04-05 SDOH — HEALTH STABILITY: PHYSICAL HEALTH: ON AVERAGE, HOW MANY DAYS PER WEEK DO YOU ENGAGE IN MODERATE TO STRENUOUS EXERCISE (LIKE A BRISK WALK)?: 4 DAYS

## 2024-04-05 SDOH — ECONOMIC STABILITY: FOOD INSECURITY: WITHIN THE PAST 12 MONTHS, YOU WORRIED THAT YOUR FOOD WOULD RUN OUT BEFORE YOU GOT MONEY TO BUY MORE.: NEVER TRUE

## 2024-04-05 SDOH — ECONOMIC STABILITY: FOOD INSECURITY: WITHIN THE PAST 12 MONTHS, THE FOOD YOU BOUGHT JUST DIDN'T LAST AND YOU DIDN'T HAVE MONEY TO GET MORE.: NEVER TRUE

## 2024-04-05 SDOH — ECONOMIC STABILITY: HOUSING INSECURITY

## 2024-04-05 SDOH — ECONOMIC STABILITY: INCOME INSECURITY: IN THE LAST 12 MONTHS, WAS THERE A TIME WHEN YOU WERE NOT ABLE TO PAY THE MORTGAGE OR RENT ON TIME?: NO

## 2024-04-05 SDOH — HEALTH STABILITY: PHYSICAL HEALTH: ON AVERAGE, HOW MANY MINUTES DO YOU ENGAGE IN EXERCISE AT THIS LEVEL?: 90 MIN

## 2024-04-05 SDOH — HEALTH STABILITY: MENTAL HEALTH
STRESS IS WHEN SOMEONE FEELS TENSE, NERVOUS, ANXIOUS, OR CAN'T SLEEP AT NIGHT BECAUSE THEIR MIND IS TROUBLED. HOW STRESSED ARE YOU?: TO SOME EXTENT

## 2024-04-05 SDOH — ECONOMIC STABILITY: INCOME INSECURITY: HOW HARD IS IT FOR YOU TO PAY FOR THE VERY BASICS LIKE FOOD, HOUSING, MEDICAL CARE, AND HEATING?: NOT HARD AT ALL

## 2024-04-05 ASSESSMENT — SOCIAL DETERMINANTS OF HEALTH (SDOH)
HOW OFTEN DO YOU ATTENT MEETINGS OF THE CLUB OR ORGANIZATION YOU BELONG TO?: NEVER
HOW OFTEN DO YOU GET TOGETHER WITH FRIENDS OR RELATIVES?: PATIENT DECLINED
WITHIN THE PAST 12 MONTHS, YOU WORRIED THAT YOUR FOOD WOULD RUN OUT BEFORE YOU GOT THE MONEY TO BUY MORE: NEVER TRUE
HOW OFTEN DO YOU ATTEND CHURCH OR RELIGIOUS SERVICES?: NEVER
HOW OFTEN DO YOU GET TOGETHER WITH FRIENDS OR RELATIVES?: PATIENT DECLINED
HOW HARD IS IT FOR YOU TO PAY FOR THE VERY BASICS LIKE FOOD, HOUSING, MEDICAL CARE, AND HEATING?: NOT HARD AT ALL
IN A TYPICAL WEEK, HOW MANY TIMES DO YOU TALK ON THE PHONE WITH FAMILY, FRIENDS, OR NEIGHBORS?: MORE THAN THREE TIMES A WEEK
IN A TYPICAL WEEK, HOW MANY TIMES DO YOU TALK ON THE PHONE WITH FAMILY, FRIENDS, OR NEIGHBORS?: MORE THAN THREE TIMES A WEEK
HOW OFTEN DO YOU ATTEND CHURCH OR RELIGIOUS SERVICES?: NEVER
HOW OFTEN DO YOU HAVE SIX OR MORE DRINKS ON ONE OCCASION: NEVER
DO YOU BELONG TO ANY CLUBS OR ORGANIZATIONS SUCH AS CHURCH GROUPS UNIONS, FRATERNAL OR ATHLETIC GROUPS, OR SCHOOL GROUPS?: YES
HOW OFTEN DO YOU ATTENT MEETINGS OF THE CLUB OR ORGANIZATION YOU BELONG TO?: NEVER
DO YOU BELONG TO ANY CLUBS OR ORGANIZATIONS SUCH AS CHURCH GROUPS UNIONS, FRATERNAL OR ATHLETIC GROUPS, OR SCHOOL GROUPS?: YES
HOW OFTEN DO YOU HAVE A DRINK CONTAINING ALCOHOL: NEVER
HOW MANY DRINKS CONTAINING ALCOHOL DO YOU HAVE ON A TYPICAL DAY WHEN YOU ARE DRINKING: PATIENT DOES NOT DRINK

## 2024-04-05 ASSESSMENT — LIFESTYLE VARIABLES
AUDIT-C TOTAL SCORE: 0
HOW MANY STANDARD DRINKS CONTAINING ALCOHOL DO YOU HAVE ON A TYPICAL DAY: PATIENT DOES NOT DRINK
HOW OFTEN DO YOU HAVE SIX OR MORE DRINKS ON ONE OCCASION: NEVER
HOW OFTEN DO YOU HAVE A DRINK CONTAINING ALCOHOL: NEVER
SKIP TO QUESTIONS 9-10: 1

## 2024-04-08 ENCOUNTER — APPOINTMENT (OUTPATIENT)
Dept: MEDICAL GROUP | Facility: MEDICAL CENTER | Age: 64
End: 2024-04-08
Payer: OTHER GOVERNMENT

## 2024-04-08 VITALS
OXYGEN SATURATION: 97 % | DIASTOLIC BLOOD PRESSURE: 82 MMHG | BODY MASS INDEX: 25.16 KG/M2 | SYSTOLIC BLOOD PRESSURE: 150 MMHG | HEART RATE: 84 BPM | WEIGHT: 151 LBS | TEMPERATURE: 98.5 F | RESPIRATION RATE: 16 BRPM | HEIGHT: 65 IN

## 2024-04-08 DIAGNOSIS — Z12.31 ENCOUNTER FOR SCREENING MAMMOGRAM FOR BREAST CANCER: ICD-10-CM

## 2024-04-08 DIAGNOSIS — M81.0 POSTMENOPAUSAL BONE LOSS: ICD-10-CM

## 2024-04-08 DIAGNOSIS — Z00.00 PREVENTATIVE HEALTH CARE: ICD-10-CM

## 2024-04-08 DIAGNOSIS — Z12.11 COLON CANCER SCREENING: ICD-10-CM

## 2024-04-08 DIAGNOSIS — E78.5 DYSLIPIDEMIA: Chronic | ICD-10-CM

## 2024-04-08 PROCEDURE — 99396 PREV VISIT EST AGE 40-64: CPT | Performed by: INTERNAL MEDICINE

## 2024-04-08 PROCEDURE — 3079F DIAST BP 80-89 MM HG: CPT | Performed by: INTERNAL MEDICINE

## 2024-04-08 PROCEDURE — 3077F SYST BP >= 140 MM HG: CPT | Performed by: INTERNAL MEDICINE

## 2024-04-08 RX ORDER — ATORVASTATIN CALCIUM 20 MG/1
20 TABLET, FILM COATED ORAL EVERY EVENING
Qty: 90 TABLET | Refills: 0 | Status: SHIPPED | OUTPATIENT
Start: 2024-04-08

## 2024-04-08 RX ORDER — AZELASTINE HYDROCHLORIDE 0.5 MG/ML
1 SOLUTION/ DROPS OPHTHALMIC
Qty: 6 ML | Refills: 3 | Status: SHIPPED | OUTPATIENT
Start: 2024-04-08

## 2024-04-08 RX ORDER — LOSARTAN POTASSIUM 25 MG/1
25 TABLET ORAL DAILY
Qty: 90 TABLET | Refills: 3 | Status: SHIPPED | OUTPATIENT
Start: 2024-04-08

## 2024-04-08 ASSESSMENT — PATIENT HEALTH QUESTIONNAIRE - PHQ9: CLINICAL INTERPRETATION OF PHQ2 SCORE: 0

## 2024-04-08 ASSESSMENT — FIBROSIS 4 INDEX: FIB4 SCORE: 1.37

## 2024-04-08 NOTE — PROGRESS NOTES
Subjective     Autumn Juárez is a 63 y.o. female who presents with Annual Exam            HPI    Here for annual exam   Eye exam annually uses glasses   Teeth cleaning twice per year   Hearing no changes   Medications, allergies, medical history, surgical history, social history, family history  reviewed and updated  Continues on metoprolol 25 mg, losartan 25 mg daily, has been checking her blood pressures since last summer, blood pressures on average are running 125/77, varies at times the day she checks her blood pressure, she does have whitecoat component.  Exercises by doing weight training 4 days per weeks, no stretching   Has gained about 5 lbs over the winter but she usually is less active throughout the winter and more active during the summer walking her dog and walking outdoors.  No sweets, nutrition will eat oatmeal and flax seeds or eggs three at a time, for lunch may eat yogurt or chicken, protein bar during the day, for dinner protein chicken, salmon, steak, and salad. Does snack on almonds, walnuts. Hydrates well with water 64 oz, coffee in am 2 cups, decaf tea in evening, rare diet soda, no etoh   Dyslipidemia on Lipitor 20 mg daily no previous muscle cramps or aches with statin therapy  Estrace for vaginal atrophy, sees [facility]  Sleep 7 hours for five days per week, and a few nights a week will not sleep as well, occasional nocturia  Watery eyes depending on the wind, no history of asthma  No chest pain or palpitations with exercise          Current Outpatient Medications   Medication Sig Dispense Refill    metoprolol SR (TOPROL XL) 25 MG TABLET SR 24 HR TAKE 1 TABLET BY MOUTH EVERY DAY 90 Tablet 0    losartan (COZAAR) 25 MG Tab TAKE 1 TABLET BY MOUTH EVERY DAY 90 Tablet 0    atorvastatin (LIPITOR) 20 MG Tab TAKE 1 TABLET BY MOUTH EVERY DAY IN THE EVENING 90 Tablet 0    estradiol (ESTRACE) 0.1 MG/GM vaginal cream Insert 1 g into the vagina every day. Daily X 7 days then 2-3 X weekly   Indications: Vulvovaginal Atrophy 42.5 g 3    CREATINE PO Take  by mouth.      Multiple Vitamins-Minerals (MULTIVITAMIN PO) Take  by mouth.      Cholecalciferol (VITAMIN D PO) Take  by mouth.      Cetirizine HCl (ZYRTEC ALLERGY PO) Take  by mouth.      FISH OIL by Does not apply route.      Calcium Carbonate (CALCIUM 500 PO) Take  by mouth.       No current facility-administered medications for this visit.       dry eye  9/26/16  note  10/25/16 ophthalmology note     Dyslipidemia  8/10 chol 195,trig 63,hdl 62,ldl 120  8/11 chol 199,trig 63,hdl 54,ldl 132  5/13 chol 184,trig 56,hdl 58,ldl 115  4/18/14 chol 194,trig 71,hdl 60,ldl 120  5/5/14 chol 207,trig 124,hdl 57,ldl 125,LDL-P 1380,HDL-P 34,LP-IR <25; 10 year risk 2.1%  8/31/15 chol 201,trig 154,hdl 49,ldl 121  9/17/16 chol 197,trig 76,hdl 68,ldl 114  9/5/17 chol 216,trig 80,hdl 59,ldl 141  10/11/19 chol 225,trig 94,hdl 58,ldl 148; 10 year risk 5.9%   2/23/21 chol 211,trig 86,hdl 66,ldl 130; 10 year risk 4.75%  6/8/22 chol 279,trig 99,hdl 67,ldl 192; 10 year risk 8.5%  6/13/22 start lipitor 20 mg   7/13/22 chol 159,trig 78,hdl 56,ldl 87 on lipitor 20 mg  4/28/23 chol 222,trig 85,hdl 64,ldl 141 on lipitor 20 mg  8/16/23 chol 157,trig 63,hdl 57,ldl 87 on lipitor 20 mg      history of tonsiillar hypertrophy  12/10 ENT  note nasopharyngoscopy tonsillar hypertrophy, possible lingual tonsillitis, could not rule out underlying mass, will do course of abx and repeat nasopharyngoscopy an MRI tongue base to be done afterwards and  1/11 dr.van garrison note ENT repeat nasopharyngoscopy showed improved tonsillar hypertrophy and tonsillitis after antibiotics      Hypertension  4/28/14 on toprol and start asa  5/2/14 urine mac <0.5 on toprol 25 mg  9/17/16 urine mac <0.7 on toprol 25 mg   1/12/18 urine mac <0.7 on toprol 25 mg  10/8/19 echo normal LV function, EF 65%, no evidence of LVH continue to monitor home blood pressures on toprol notify us if systolic  above 140 consistently  10/11/19 renin 0.5 and aldosterone 11.0, plasma metanephrine and normetanephrine normal on toprol 25 mg  2/23/21 urine mac<3.0 on toprol 25 mg  4/14/22 add losartan 25 mg to toprol 25 mg  6/8/22 urine mac<1.2 on losartan 25 mg and toprol 25 mg  6/28/22 echo EF 60 to 65%, normal diastolic function, RVSP 25  4/28/23 urine mac<1.2 on losartan 25 mg and toprol 25 mg    impaired glucose metabolism  4/28/23 A1c 5.8%  8/16/23 A1c 5.6%     osteopenia  2/17/20 dexa LS-1.9,hip-2.1  2/23/21 vit d 96 on 5000 daily, change to every qod  5/17/22 dexa LS-1.5,hip-2.1   6/8/22 vit d 81  4/28/23 vit d 63     Preventative health  6/6/14 colon by [facility] diverticulosis  10/11/19 hep c ab negative  12/5/19 shingrix second   2/16/21 sonocine negative  5/17/22 dexa LS-1.5,hip-2.1   12/15/22 tdap  1/17/23 mammogram heterogeneously dense breast tissue offered screening breast ultrasound  1/31/23 cologuard negative   4/28/23 vit d 63  4/24/23 dr.sohini [facility] note      vaginal atrophy  11/17/20 topical HRT per gynecology  12/15/22 topical estradiol twice per week               Patient Active Problem List   Diagnosis    Hypertension    Preventative health care    Dyslipidemia    History of tonsillar hypertrophy    Dry eye syndrome    Vaginal atrophy    Osteopenia    Impaired glucose metabolism       Depression Screening  Little interest or pleasure in doing things?  0 - not at all  Feeling down, depressed , or hopeless? 0 - not at all  Patient Health Questionnaire Score: 0                  Patient Care Team:  Master Dash M.D. as PCP - General    ROS    Does have left-sided upper arm pain recently, she noticed after doing bench press workout, has cut back on her weight but even benching 115 pounds today she did notice some left upper arm tenderness, no discomfort with doing dips, no radiation down her arm, no numbness or tingling of her left hand, no weakness of her left hand  Occasional watery eyes with wind  "blowing, no contacts  Did hurt her knees last year falling accidentally while walking outside, saw her chiropractor and that improved, no further knee pain         Objective     BP (!) 156/84 (BP Location: Right arm, Patient Position: Sitting, BP Cuff Size: Adult)   Pulse 84   Temp 36.9 °C (98.5 °F)   Resp 16   Ht 1.651 m (5' 5\")   Wt 68.5 kg (151 lb)   LMP 06/01/2011   SpO2 97%   BMI 25.13 kg/m²      Physical Exam  Vitals and nursing note reviewed.   Constitutional:       General: She is not in acute distress.     Appearance: Normal appearance.   HENT:      Head: Normocephalic and atraumatic.      Right Ear: Tympanic membrane and external ear normal.      Left Ear: Tympanic membrane and external ear normal.      Nose: Nose normal.      Mouth/Throat:      Pharynx: No oropharyngeal exudate.   Eyes:      Conjunctiva/sclera: Conjunctivae normal.   Cardiovascular:      Rate and Rhythm: Normal rate and regular rhythm.      Heart sounds: Normal heart sounds.   Pulmonary:      Effort: Pulmonary effort is normal.      Breath sounds: Normal breath sounds.   Abdominal:      General: There is no distension.   Musculoskeletal:         General: No swelling.      Cervical back: Neck supple. No rigidity.   Lymphadenopathy:      Cervical: No cervical adenopathy.   Skin:     Coloration: Skin is not jaundiced.      Findings: No bruising.   Neurological:      General: No focal deficit present.      Mental Status: She is alert. Mental status is at baseline.   Psychiatric:         Mood and Affect: Mood normal.         Behavior: Behavior normal.         Thought Content: Thought content normal.       No leg swelling                      Assessment & Plan        Assessment  #1 annual exam      #2 hypertension with white coat component, on metoprolol, losartan blood pressure elevated here but her home blood pressure readings have been consistently stable running 120s over 70s and she has been taking them regularly taking her blood " pressure medications, following a low-sodium diet, keeping active with exercise    #3 dyslipidemia 8/16/23 chol 157,trig 63,hdl 57,ldl 87 on lipitor 20 mg    #4 postmenopausal bone loss 5/17/22 dexa LS-1.5,hip-2.1     #5 cervical cancer screening and vaginal atrophy per gynecology, on vaginal estrogen     #6 allergic conjunctivitis    #7 left tricep strain    Plan  #1 reviewed her blood pressure readings, from home, blood pressure stable at home, whitecoat hypertension component, most recent echo 2 years ago no LVH, she exercises consistently, her weight is up a bit 5 pounds this winter, based on her home blood pressure readings, do not believe any change in her medications is warranted.  Continue good nutrition and exercise program, continue to check blood pressures regularly, if systolic above 140 consistently to let us know, will repeat echo next year    #2 continue a good nutrition program limiting salt, processed foods in her diet    #3 due for mammogram and bone density after May 18, order placed    #4 [facility] due for colon referral placed, due for colonoscopy 10-year follow-up    #5 follow-up gynecology    #6 [employer] for allergic conjunctivitis    #7 follow-up with her chiropractor for left tricep strain    #8 continue work on her good exercise program, work on good sleep, she can try meditation    #9 advance directive paperwork provided    #10 preventative labs ordered    #11 follow-up 1 year as long as blood pressures remained stable

## 2024-05-21 ENCOUNTER — HOSPITAL ENCOUNTER (OUTPATIENT)
Dept: RADIOLOGY | Facility: MEDICAL CENTER | Age: 64
End: 2024-05-21
Attending: INTERNAL MEDICINE
Payer: OTHER GOVERNMENT

## 2024-05-21 DIAGNOSIS — Z12.31 ENCOUNTER FOR SCREENING MAMMOGRAM FOR BREAST CANCER: ICD-10-CM

## 2024-05-21 DIAGNOSIS — M81.0 POSTMENOPAUSAL BONE LOSS: ICD-10-CM

## 2024-05-22 ENCOUNTER — TELEPHONE (OUTPATIENT)
Dept: MEDICAL GROUP | Facility: MEDICAL CENTER | Age: 64
End: 2024-05-22
Payer: OTHER GOVERNMENT

## 2024-05-22 ENCOUNTER — APPOINTMENT (RX ONLY)
Dept: URBAN - METROPOLITAN AREA CLINIC 22 | Facility: CLINIC | Age: 64
Setting detail: DERMATOLOGY
End: 2024-05-22

## 2024-05-22 DIAGNOSIS — L81.4 OTHER MELANIN HYPERPIGMENTATION: ICD-10-CM

## 2024-05-22 DIAGNOSIS — L57.0 ACTINIC KERATOSIS: ICD-10-CM

## 2024-05-22 DIAGNOSIS — D18.0 HEMANGIOMA: ICD-10-CM

## 2024-05-22 DIAGNOSIS — L82.0 INFLAMED SEBORRHEIC KERATOSIS: ICD-10-CM

## 2024-05-22 DIAGNOSIS — D22 MELANOCYTIC NEVI: ICD-10-CM

## 2024-05-22 DIAGNOSIS — Z71.89 OTHER SPECIFIED COUNSELING: ICD-10-CM

## 2024-05-22 DIAGNOSIS — L82.1 OTHER SEBORRHEIC KERATOSIS: ICD-10-CM

## 2024-05-22 PROBLEM — D18.01 HEMANGIOMA OF SKIN AND SUBCUTANEOUS TISSUE: Status: ACTIVE | Noted: 2024-05-22

## 2024-05-22 PROBLEM — D22.5 MELANOCYTIC NEVI OF TRUNK: Status: ACTIVE | Noted: 2024-05-22

## 2024-05-22 PROBLEM — D23.121 OTHER BENIGN NEOPLASM OF SKIN OF LEFT UPPER EYELID, INCLUDING CANTHUS: Status: ACTIVE | Noted: 2024-05-22

## 2024-05-22 PROCEDURE — ? SUNSCREEN RECOMMENDATIONS

## 2024-05-22 PROCEDURE — 99213 OFFICE O/P EST LOW 20 MIN: CPT | Mod: 25

## 2024-05-22 PROCEDURE — 17003 DESTRUCT PREMALG LES 2-14: CPT | Mod: 59

## 2024-05-22 PROCEDURE — ? LIQUID NITROGEN

## 2024-05-22 PROCEDURE — ? COUNSELING

## 2024-05-22 PROCEDURE — ? ADDITIONAL NOTES

## 2024-05-22 PROCEDURE — 17110 DESTRUCTION B9 LES UP TO 14: CPT

## 2024-05-22 PROCEDURE — 17000 DESTRUCT PREMALG LESION: CPT | Mod: 59

## 2024-05-22 PROCEDURE — ? DIAGNOSIS COMMENT

## 2024-05-22 ASSESSMENT — LOCATION SIMPLE DESCRIPTION DERM
LOCATION SIMPLE: RIGHT UPPER BACK
LOCATION SIMPLE: LEFT FOREARM
LOCATION SIMPLE: LEFT ZYGOMA
LOCATION SIMPLE: ABDOMEN
LOCATION SIMPLE: LEFT FOREHEAD
LOCATION SIMPLE: LEFT UPPER BACK
LOCATION SIMPLE: LEFT TEMPLE
LOCATION SIMPLE: RIGHT LOWER BACK
LOCATION SIMPLE: RIGHT CHEEK
LOCATION SIMPLE: LEFT CHEEK

## 2024-05-22 ASSESSMENT — LOCATION ZONE DERM
LOCATION ZONE: TRUNK
LOCATION ZONE: FACE
LOCATION ZONE: ARM

## 2024-05-22 ASSESSMENT — LOCATION DETAILED DESCRIPTION DERM
LOCATION DETAILED: LEFT INFERIOR UPPER BACK
LOCATION DETAILED: RIGHT INFERIOR LATERAL LOWER BACK
LOCATION DETAILED: LEFT CENTRAL MANDIBULAR CHEEK
LOCATION DETAILED: LEFT PROXIMAL DORSAL FOREARM
LOCATION DETAILED: LEFT LATERAL ABDOMEN
LOCATION DETAILED: RIGHT MID-UPPER BACK
LOCATION DETAILED: RIGHT SUPERIOR PREAURICULAR CHEEK
LOCATION DETAILED: LEFT SUPERIOR PREAURICULAR CHEEK
LOCATION DETAILED: LEFT CENTRAL ZYGOMA
LOCATION DETAILED: LEFT LATERAL TEMPLE
LOCATION DETAILED: LEFT SUPERIOR FOREHEAD

## 2024-05-22 NOTE — TELEPHONE ENCOUNTER
Please notify the patient that her bone density test shows she still has decreased bone strength of her hip and back but not osteoporosis.  In fact the bone strength of her back is improved by 3% and the bone strength of her hip is essentially the same as 2 years ago.  Please have her continue taking vitamin D over-the-counter, calcium 1000 mg total per day, continue her good weightbearing exercises and we can repeat the bone density test in 2 years.

## 2024-05-22 NOTE — PROCEDURE: LIQUID NITROGEN
Render Post-Care Instructions In Note?: no
Show Aperture Variable?: Yes
Medical Necessity Clause: This procedure was medically necessary because the lesions that were treated were:
Post-Care Instructions: I reviewed with the patient in detail post-care instructions. Patient is to wear sunprotection, and avoid picking at any of the treated lesions. Pt may apply Vaseline to crusted or scabbing areas.
Detail Level: Detailed
Spray Paint Text: The liquid nitrogen was applied to the skin utilizing a spray paint frosting technique.
Medical Necessity Information: It is in your best interest to select a reason for this procedure from the list below. All of these items fulfill various CMS LCD requirements except the new and changing color options.
Consent: The patient's mom’s consent was obtained including but not limited to risks of crusting, scabbing, blistering, scarring, darker or lighter pigmentary change, recurrence, incomplete removal and infection.
Number Of Freeze-Thaw Cycles: 2 freeze-thaw cycles
Consent: The patient's consent was obtained including but not limited to risks of crusting, scabbing, blistering, scarring, darker or lighter pigmentary change, recurrence, incomplete removal and infection.
Duration Of Freeze Thaw-Cycle (Seconds): 3

## 2024-05-22 NOTE — PROCEDURE: ADDITIONAL NOTES
Render Risk Assessment In Note?: no
Detail Level: Simple
Additional Notes: NER today. Will continue to monitor\\nRecommend annual skin checks.
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  It has been present for sometime however she states she would be interested in possible removal.    Discussed seeing oculoplastics for full removal due to involvement of lid margin.
Additional Notes: Pt gave verbal consent to proceed w/ LN2.

## 2024-05-23 ENCOUNTER — TELEPHONE (OUTPATIENT)
Dept: MEDICAL GROUP | Facility: MEDICAL CENTER | Age: 64
End: 2024-05-23
Payer: OTHER GOVERNMENT

## 2024-05-23 DIAGNOSIS — R92.30 DENSE BREAST TISSUE ON MAMMOGRAM, UNSPECIFIED TYPE: ICD-10-CM

## 2024-05-23 NOTE — TELEPHONE ENCOUNTER
Please notify the patient that:  (1) her mammogram is negative but does show dense breast tissue which may decrease the accuracy of the mammogram  (2) she may obtain a screening breast ultrasound which could provide further detail  (3) [facility] offers the screening breast ultrasound, however some insurance plans may not cover the screening breast ultrasound  (4) if the screening breast ultrasound is not covered, typically the out-of-pocket expense is approximately $125  (5) if she would like me to order the screening breast ultrasound let me know, and I can submit that order to [facility], otherwise we can repeat the screening mammogram in 1 year

## 2024-05-23 NOTE — TELEPHONE ENCOUNTER
----- Message from Master Dash M.D. sent at 5/22/2024  3:25 AM PDT -----  Please notify the patient that her bone density test shows she still has decreased bone strength of her hip and back but not osteoporosis.  In fact the bone strength of her back is improved by 3% and the bone strength of her hip is essentially the same as 2 years ago.  Please have her continue taking vitamin D over-the-counter, calcium 1000 mg total per day, continue her good weightbearing exercises and we can repeat the bone density test in 2 years.

## 2024-05-23 NOTE — TELEPHONE ENCOUNTER
----- Message from Master Dash M.D. sent at 5/22/2024  6:12 PM PDT -----  Please notify the patient that:  (1) her mammogram is negative but does show dense breast tissue which may decrease the accuracy of the mammogram  (2) she may obtain a screening breast ultrasound which could provide further detail  (3) [facility] offers the screening breast ultrasound, however some insurance plans may not cover the screening breast ultrasound  (4) if the screening breast ultrasound is not covered, typically the out-of-pocket expense is approximately $125  (5) if she would like me to order the screening breast ultrasound let me know, and I can submit that order to [facility], otherwise we can repeat the screening mammogram in 1 year

## 2024-06-08 ENCOUNTER — HOSPITAL ENCOUNTER (OUTPATIENT)
Dept: LAB | Facility: MEDICAL CENTER | Age: 64
End: 2024-06-08
Attending: INTERNAL MEDICINE
Payer: OTHER GOVERNMENT

## 2024-06-08 DIAGNOSIS — Z00.00 PREVENTATIVE HEALTH CARE: ICD-10-CM

## 2024-06-08 PROCEDURE — 85025 COMPLETE CBC W/AUTO DIFF WBC: CPT

## 2024-06-08 PROCEDURE — 82043 UR ALBUMIN QUANTITATIVE: CPT

## 2024-06-08 PROCEDURE — 82570 ASSAY OF URINE CREATININE: CPT

## 2024-06-08 PROCEDURE — 83036 HEMOGLOBIN GLYCOSYLATED A1C: CPT

## 2024-06-08 PROCEDURE — 84443 ASSAY THYROID STIM HORMONE: CPT

## 2024-06-08 PROCEDURE — 80061 LIPID PANEL: CPT

## 2024-06-08 PROCEDURE — 82306 VITAMIN D 25 HYDROXY: CPT

## 2024-06-08 PROCEDURE — 81001 URINALYSIS AUTO W/SCOPE: CPT

## 2024-06-08 PROCEDURE — 80053 COMPREHEN METABOLIC PANEL: CPT

## 2024-06-08 PROCEDURE — 36415 COLL VENOUS BLD VENIPUNCTURE: CPT

## 2024-06-09 ENCOUNTER — TELEPHONE (OUTPATIENT)
Dept: MEDICAL GROUP | Facility: MEDICAL CENTER | Age: 64
End: 2024-06-09
Payer: OTHER GOVERNMENT

## 2024-06-09 LAB
25(OH)D3 SERPL-MCNC: 66 NG/ML (ref 30–100)
ALBUMIN SERPL BCP-MCNC: 4.2 G/DL (ref 3.2–4.9)
ALBUMIN/GLOB SERPL: 1.7 G/DL
ALP SERPL-CCNC: 100 U/L (ref 30–99)
ALT SERPL-CCNC: 29 U/L (ref 2–50)
ANION GAP SERPL CALC-SCNC: 10 MMOL/L (ref 7–16)
APPEARANCE UR: CLEAR
AST SERPL-CCNC: 34 U/L (ref 12–45)
BACTERIA #/AREA URNS HPF: NEGATIVE /HPF
BASOPHILS # BLD AUTO: 1.3 % (ref 0–1.8)
BASOPHILS # BLD: 0.08 K/UL (ref 0–0.12)
BILIRUB SERPL-MCNC: 0.4 MG/DL (ref 0.1–1.5)
BILIRUB UR QL STRIP.AUTO: NEGATIVE
BUN SERPL-MCNC: 28 MG/DL (ref 8–22)
CALCIUM ALBUM COR SERPL-MCNC: 9.3 MG/DL (ref 8.5–10.5)
CALCIUM SERPL-MCNC: 9.5 MG/DL (ref 8.5–10.5)
CHLORIDE SERPL-SCNC: 101 MMOL/L (ref 96–112)
CHOLEST SERPL-MCNC: 168 MG/DL (ref 100–199)
CO2 SERPL-SCNC: 25 MMOL/L (ref 20–33)
COLOR UR: YELLOW
CREAT SERPL-MCNC: 0.95 MG/DL (ref 0.5–1.4)
CREAT UR-MCNC: 98.15 MG/DL
EOSINOPHIL # BLD AUTO: 0.15 K/UL (ref 0–0.51)
EOSINOPHIL NFR BLD: 2.5 % (ref 0–6.9)
EPI CELLS #/AREA URNS HPF: NORMAL /HPF
ERYTHROCYTE [DISTWIDTH] IN BLOOD BY AUTOMATED COUNT: 41.1 FL (ref 35.9–50)
EST. AVERAGE GLUCOSE BLD GHB EST-MCNC: 123 MG/DL
FASTING STATUS PATIENT QL REPORTED: NORMAL
GFR SERPLBLD CREATININE-BSD FMLA CKD-EPI: 67 ML/MIN/1.73 M 2
GLOBULIN SER CALC-MCNC: 2.5 G/DL (ref 1.9–3.5)
GLUCOSE SERPL-MCNC: 97 MG/DL (ref 65–99)
GLUCOSE UR STRIP.AUTO-MCNC: NEGATIVE MG/DL
HBA1C MFR BLD: 5.9 % (ref 4–5.6)
HCT VFR BLD AUTO: 44.9 % (ref 37–47)
HDLC SERPL-MCNC: 51 MG/DL
HGB BLD-MCNC: 15 G/DL (ref 12–16)
HYALINE CASTS #/AREA URNS LPF: NORMAL /LPF
IMM GRANULOCYTES # BLD AUTO: 0.01 K/UL (ref 0–0.11)
IMM GRANULOCYTES NFR BLD AUTO: 0.2 % (ref 0–0.9)
KETONES UR STRIP.AUTO-MCNC: NEGATIVE MG/DL
LDLC SERPL CALC-MCNC: 103 MG/DL
LEUKOCYTE ESTERASE UR QL STRIP.AUTO: ABNORMAL
LYMPHOCYTES # BLD AUTO: 2.1 K/UL (ref 1–4.8)
LYMPHOCYTES NFR BLD: 34.7 % (ref 22–41)
MCH RBC QN AUTO: 31.3 PG (ref 27–33)
MCHC RBC AUTO-ENTMCNC: 33.4 G/DL (ref 32.2–35.5)
MCV RBC AUTO: 93.5 FL (ref 81.4–97.8)
MICRO URNS: ABNORMAL
MICROALBUMIN UR-MCNC: <1.2 MG/DL
MICROALBUMIN/CREAT UR: NORMAL MG/G (ref 0–30)
MONOCYTES # BLD AUTO: 0.64 K/UL (ref 0–0.85)
MONOCYTES NFR BLD AUTO: 10.6 % (ref 0–13.4)
NEUTROPHILS # BLD AUTO: 3.08 K/UL (ref 1.82–7.42)
NEUTROPHILS NFR BLD: 50.7 % (ref 44–72)
NITRITE UR QL STRIP.AUTO: NEGATIVE
NRBC # BLD AUTO: 0 K/UL
NRBC BLD-RTO: 0 /100 WBC (ref 0–0.2)
PH UR STRIP.AUTO: 6.5 [PH] (ref 5–8)
PLATELET # BLD AUTO: 220 K/UL (ref 164–446)
PMV BLD AUTO: 10.7 FL (ref 9–12.9)
POTASSIUM SERPL-SCNC: 4.5 MMOL/L (ref 3.6–5.5)
PROT SERPL-MCNC: 6.7 G/DL (ref 6–8.2)
PROT UR QL STRIP: NEGATIVE MG/DL
RBC # BLD AUTO: 4.8 M/UL (ref 4.2–5.4)
RBC # URNS HPF: NORMAL /HPF
RBC UR QL AUTO: NEGATIVE
SODIUM SERPL-SCNC: 136 MMOL/L (ref 135–145)
SP GR UR STRIP.AUTO: 1.02
TRIGL SERPL-MCNC: 70 MG/DL (ref 0–149)
TSH SERPL DL<=0.005 MIU/L-ACNC: 2.2 UIU/ML (ref 0.38–5.33)
UROBILINOGEN UR STRIP.AUTO-MCNC: 0.2 MG/DL
WBC # BLD AUTO: 6.1 K/UL (ref 4.8–10.8)
WBC #/AREA URNS HPF: NORMAL /HPF

## 2024-06-10 NOTE — TELEPHONE ENCOUNTER
Notified with labs, A1c slightly higher, she did gain a bit of weight over the winter months, typically at least the last few years her sugar and cholesterol have been a bit higher coming out of the winter into spring, but during the summer months she is able to lose the extra weight, based on these results no medications are needed for sugars, no changes necessary for her cholesterol medication, continue to work on a good nutrition and exercise program.

## 2024-06-13 DIAGNOSIS — I10 ESSENTIAL HYPERTENSION: ICD-10-CM

## 2024-06-13 RX ORDER — METOPROLOL SUCCINATE 25 MG/1
TABLET, EXTENDED RELEASE ORAL
Qty: 90 TABLET | Refills: 3 | Status: SHIPPED | OUTPATIENT
Start: 2024-06-13

## 2024-07-05 DIAGNOSIS — E78.5 DYSLIPIDEMIA: Chronic | ICD-10-CM

## 2024-07-05 RX ORDER — ATORVASTATIN CALCIUM 20 MG/1
20 TABLET, FILM COATED ORAL EVERY EVENING
Qty: 90 TABLET | Refills: 3 | Status: SHIPPED | OUTPATIENT
Start: 2024-07-05

## 2024-08-20 ENCOUNTER — HOSPITAL ENCOUNTER (OUTPATIENT)
Dept: RADIOLOGY | Facility: MEDICAL CENTER | Age: 64
End: 2024-08-20
Attending: INTERNAL MEDICINE
Payer: OTHER GOVERNMENT

## 2024-08-20 DIAGNOSIS — R92.30 DENSE BREAST TISSUE ON MAMMOGRAM, UNSPECIFIED TYPE: ICD-10-CM

## 2024-08-20 PROCEDURE — 76641 ULTRASOUND BREAST COMPLETE: CPT

## 2024-08-21 ENCOUNTER — TELEPHONE (OUTPATIENT)
Dept: MEDICAL GROUP | Facility: MEDICAL CENTER | Age: 64
End: 2024-08-21
Payer: OTHER GOVERNMENT

## 2024-08-21 DIAGNOSIS — Z00.00 PREVENTATIVE HEALTH CARE: ICD-10-CM

## 2024-08-22 ENCOUNTER — TELEPHONE (OUTPATIENT)
Dept: MEDICAL GROUP | Facility: MEDICAL CENTER | Age: 64
End: 2024-08-22
Payer: OTHER GOVERNMENT

## 2024-08-22 NOTE — TELEPHONE ENCOUNTER
----- Message from Physician Master Dash M.D. sent at 8/21/2024  5:29 PM PDT -----  Please notify the patient that her screening breast ultrasound is negative, she can continue with annual mammography

## 2024-08-22 NOTE — TELEPHONE ENCOUNTER
Please notify the patient that her screening breast ultrasound is negative, she can continue with annual mammography

## 2025-03-30 ENCOUNTER — TELEPHONE (OUTPATIENT)
Dept: MEDICAL GROUP | Facility: MEDICAL CENTER | Age: 65
End: 2025-03-30
Payer: OTHER GOVERNMENT

## 2025-03-30 RX ORDER — LOSARTAN POTASSIUM 25 MG/1
25 TABLET ORAL DAILY
Qty: 100 TABLET | Refills: 0 | Status: SHIPPED | OUTPATIENT
Start: 2025-03-30

## 2025-05-22 ENCOUNTER — APPOINTMENT (OUTPATIENT)
Dept: URBAN - METROPOLITAN AREA CLINIC 22 | Facility: CLINIC | Age: 65
Setting detail: DERMATOLOGY
End: 2025-05-22

## 2025-05-22 DIAGNOSIS — L82.1 OTHER SEBORRHEIC KERATOSIS: ICD-10-CM

## 2025-05-22 DIAGNOSIS — D18.0 HEMANGIOMA: ICD-10-CM

## 2025-05-22 DIAGNOSIS — H00.1 CHALAZION: ICD-10-CM

## 2025-05-22 DIAGNOSIS — L81.4 OTHER MELANIN HYPERPIGMENTATION: ICD-10-CM

## 2025-05-22 DIAGNOSIS — D22 MELANOCYTIC NEVI: ICD-10-CM

## 2025-05-22 DIAGNOSIS — L57.0 ACTINIC KERATOSIS: ICD-10-CM

## 2025-05-22 DIAGNOSIS — Z71.89 OTHER SPECIFIED COUNSELING: ICD-10-CM

## 2025-05-22 PROBLEM — D22.5 MELANOCYTIC NEVI OF TRUNK: Status: ACTIVE | Noted: 2025-05-22

## 2025-05-22 PROBLEM — D18.01 HEMANGIOMA OF SKIN AND SUBCUTANEOUS TISSUE: Status: ACTIVE | Noted: 2025-05-22

## 2025-05-22 PROBLEM — H00.19 CHALAZION UNSPECIFIED EYE, UNSPECIFIED EYELID: Status: ACTIVE | Noted: 2025-05-22

## 2025-05-22 PROCEDURE — 99213 OFFICE O/P EST LOW 20 MIN: CPT | Mod: 25

## 2025-05-22 PROCEDURE — ? SUNSCREEN RECOMMENDATIONS

## 2025-05-22 PROCEDURE — ? COUNSELING

## 2025-05-22 PROCEDURE — 17003 DESTRUCT PREMALG LES 2-14: CPT

## 2025-05-22 PROCEDURE — 17000 DESTRUCT PREMALG LESION: CPT

## 2025-05-22 PROCEDURE — ? LIQUID NITROGEN

## 2025-05-22 PROCEDURE — ? ADDITIONAL NOTES

## 2025-05-22 ASSESSMENT — LOCATION DETAILED DESCRIPTION DERM
LOCATION DETAILED: RIGHT MID-UPPER BACK
LOCATION DETAILED: RIGHT CENTRAL TEMPLE
LOCATION DETAILED: RIGHT INFERIOR LATERAL FOREHEAD
LOCATION DETAILED: RIGHT NASAL SIDEWALL
LOCATION DETAILED: LEFT PROXIMAL DORSAL FOREARM
LOCATION DETAILED: LEFT INFERIOR UPPER BACK
LOCATION DETAILED: RIGHT CENTRAL MALAR CHEEK
LOCATION DETAILED: LEFT LATERAL ABDOMEN
LOCATION DETAILED: RIGHT LATERAL BUCCAL CHEEK

## 2025-05-22 ASSESSMENT — LOCATION ZONE DERM
LOCATION ZONE: ARM
LOCATION ZONE: NOSE
LOCATION ZONE: FACE
LOCATION ZONE: TRUNK

## 2025-05-22 ASSESSMENT — LOCATION SIMPLE DESCRIPTION DERM
LOCATION SIMPLE: LEFT UPPER BACK
LOCATION SIMPLE: RIGHT CHEEK
LOCATION SIMPLE: RIGHT TEMPLE
LOCATION SIMPLE: RIGHT NOSE
LOCATION SIMPLE: ABDOMEN
LOCATION SIMPLE: RIGHT UPPER BACK
LOCATION SIMPLE: RIGHT FOREHEAD
LOCATION SIMPLE: LEFT FOREARM

## 2025-05-22 NOTE — PROCEDURE: ADDITIONAL NOTES
Additional Notes: Pt reassured this appears benign, possible chalazion.  It has been present for sometime however she states she would be interested in possible removal.    Discussed seeing oculoplastics for full removal due to involvement of lid margin.
Render Risk Assessment In Note?: no
Detail Level: Simple

## 2025-05-22 NOTE — PROCEDURE: LIQUID NITROGEN
Show Aperture Variable?: Yes
Consent: The patient's consent was obtained including but not limited to risks of crusting, scabbing, blistering, scarring, darker or lighter pigmentary change, recurrence, incomplete removal and infection.
Render Note In Bullet Format When Appropriate: No
Detail Level: Detailed
Number Of Freeze-Thaw Cycles: 2 freeze-thaw cycles
Duration Of Freeze Thaw-Cycle (Seconds): 3
Post-Care Instructions: I reviewed with the patient in detail post-care instructions. Patient is to wear sunprotection, and avoid picking at any of the treated lesions. Pt may apply Vaseline to crusted or scabbing areas.

## 2025-06-01 ENCOUNTER — TELEPHONE (OUTPATIENT)
Dept: MEDICAL GROUP | Facility: MEDICAL CENTER | Age: 65
End: 2025-06-01
Payer: OTHER GOVERNMENT

## 2025-06-01 DIAGNOSIS — Z00.00 PREVENTATIVE HEALTH CARE: Primary | ICD-10-CM

## 2025-06-01 RX ORDER — PROGESTERONE 100 MG/1
CAPSULE ORAL
COMMUNITY
Start: 2025-03-12

## 2025-06-01 RX ORDER — ESTRADIOL 0.05 MG/D
PATCH, EXTENDED RELEASE TRANSDERMAL
COMMUNITY
Start: 2025-03-12

## 2025-06-02 ENCOUNTER — APPOINTMENT (OUTPATIENT)
Dept: MEDICAL GROUP | Facility: MEDICAL CENTER | Age: 65
End: 2025-06-02
Payer: OTHER GOVERNMENT

## 2025-06-02 ENCOUNTER — TELEPHONE (OUTPATIENT)
Dept: MEDICAL GROUP | Facility: MEDICAL CENTER | Age: 65
End: 2025-06-02

## 2025-06-02 VITALS
DIASTOLIC BLOOD PRESSURE: 80 MMHG | WEIGHT: 142.4 LBS | HEART RATE: 62 BPM | HEIGHT: 65 IN | OXYGEN SATURATION: 100 % | SYSTOLIC BLOOD PRESSURE: 144 MMHG | TEMPERATURE: 99.4 F | BODY MASS INDEX: 23.72 KG/M2

## 2025-06-02 DIAGNOSIS — E55.9 VITAMIN D DEFICIENCY: ICD-10-CM

## 2025-06-02 DIAGNOSIS — Z78.0 POST-MENOPAUSAL: ICD-10-CM

## 2025-06-02 DIAGNOSIS — Z00.00 PREVENTATIVE HEALTH CARE: ICD-10-CM

## 2025-06-02 DIAGNOSIS — I10 PRIMARY HYPERTENSION: Primary | Chronic | ICD-10-CM

## 2025-06-02 DIAGNOSIS — E78.5 DYSLIPIDEMIA: Chronic | ICD-10-CM

## 2025-06-02 DIAGNOSIS — Z12.31 ENCOUNTER FOR SCREENING MAMMOGRAM FOR BREAST CANCER: ICD-10-CM

## 2025-06-02 DIAGNOSIS — R73.09 IMPAIRED GLUCOSE METABOLISM: ICD-10-CM

## 2025-06-02 DIAGNOSIS — R92.30 DENSE BREAST: ICD-10-CM

## 2025-06-02 PROCEDURE — 3079F DIAST BP 80-89 MM HG: CPT | Performed by: INTERNAL MEDICINE

## 2025-06-02 PROCEDURE — 3077F SYST BP >= 140 MM HG: CPT | Performed by: INTERNAL MEDICINE

## 2025-06-02 PROCEDURE — 99396 PREV VISIT EST AGE 40-64: CPT | Performed by: INTERNAL MEDICINE

## 2025-06-02 SDOH — HEALTH STABILITY: PHYSICAL HEALTH: ON AVERAGE, HOW MANY DAYS PER WEEK DO YOU ENGAGE IN MODERATE TO STRENUOUS EXERCISE (LIKE A BRISK WALK)?: 4 DAYS

## 2025-06-02 SDOH — ECONOMIC STABILITY: INCOME INSECURITY: IN THE LAST 12 MONTHS, WAS THERE A TIME WHEN YOU WERE NOT ABLE TO PAY THE MORTGAGE OR RENT ON TIME?: NO

## 2025-06-02 SDOH — ECONOMIC STABILITY: FOOD INSECURITY: WITHIN THE PAST 12 MONTHS, THE FOOD YOU BOUGHT JUST DIDN'T LAST AND YOU DIDN'T HAVE MONEY TO GET MORE.: NEVER TRUE

## 2025-06-02 SDOH — ECONOMIC STABILITY: INCOME INSECURITY: HOW HARD IS IT FOR YOU TO PAY FOR THE VERY BASICS LIKE FOOD, HOUSING, MEDICAL CARE, AND HEATING?: NOT HARD AT ALL

## 2025-06-02 SDOH — HEALTH STABILITY: PHYSICAL HEALTH: ON AVERAGE, HOW MANY MINUTES DO YOU ENGAGE IN EXERCISE AT THIS LEVEL?: 90 MIN

## 2025-06-02 SDOH — ECONOMIC STABILITY: FOOD INSECURITY: WITHIN THE PAST 12 MONTHS, YOU WORRIED THAT YOUR FOOD WOULD RUN OUT BEFORE YOU GOT MONEY TO BUY MORE.: NEVER TRUE

## 2025-06-02 ASSESSMENT — SOCIAL DETERMINANTS OF HEALTH (SDOH)
HOW MANY DRINKS CONTAINING ALCOHOL DO YOU HAVE ON A TYPICAL DAY WHEN YOU ARE DRINKING: PATIENT DOES NOT DRINK
HOW OFTEN DO YOU HAVE A DRINK CONTAINING ALCOHOL: NEVER
IN THE PAST 12 MONTHS, HAS THE ELECTRIC, GAS, OIL, OR WATER COMPANY THREATENED TO SHUT OFF SERVICE IN YOUR HOME?: NO
HOW HARD IS IT FOR YOU TO PAY FOR THE VERY BASICS LIKE FOOD, HOUSING, MEDICAL CARE, AND HEATING?: NOT HARD AT ALL
HOW OFTEN DO YOU ATTEND CHURCH OR RELIGIOUS SERVICES?: NEVER
HOW OFTEN DO YOU ATTEND CHURCH OR RELIGIOUS SERVICES?: NEVER
HOW OFTEN DO YOU HAVE SIX OR MORE DRINKS ON ONE OCCASION: NEVER
WITHIN THE PAST 12 MONTHS, YOU WORRIED THAT YOUR FOOD WOULD RUN OUT BEFORE YOU GOT THE MONEY TO BUY MORE: NEVER TRUE
IN A TYPICAL WEEK, HOW MANY TIMES DO YOU TALK ON THE PHONE WITH FAMILY, FRIENDS, OR NEIGHBORS?: MORE THAN THREE TIMES A WEEK
IN A TYPICAL WEEK, HOW MANY TIMES DO YOU TALK ON THE PHONE WITH FAMILY, FRIENDS, OR NEIGHBORS?: MORE THAN THREE TIMES A WEEK

## 2025-06-02 ASSESSMENT — LIFESTYLE VARIABLES
AUDIT-C TOTAL SCORE: 0
SKIP TO QUESTIONS 9-10: 1
HOW MANY STANDARD DRINKS CONTAINING ALCOHOL DO YOU HAVE ON A TYPICAL DAY: PATIENT DOES NOT DRINK
HOW OFTEN DO YOU HAVE SIX OR MORE DRINKS ON ONE OCCASION: NEVER
HOW OFTEN DO YOU HAVE A DRINK CONTAINING ALCOHOL: NEVER

## 2025-06-02 ASSESSMENT — PATIENT HEALTH QUESTIONNAIRE - PHQ9: CLINICAL INTERPRETATION OF PHQ2 SCORE: 0

## 2025-06-02 ASSESSMENT — FIBROSIS 4 INDEX: FIB4 SCORE: 1.81

## 2025-06-02 NOTE — LETTER
[facility]  Master Dash M.D.  [address]  [address]  [place] [address]  Fax: [phone]   Authorization for Release/Disclosure of   Protected Health Information   Name: AUTUMN JUÁREZ : 1960 SSN: [id number]   Address: [address] [place] [address] [address] [place] [address] Phone:    There are no phone numbers on file.   I authorize the entity listed below to release/disclose the PHI below to:   [facility]/Master Dash M.D. and Master Dash M.D.   Provider or Entity Name:  [facility]    Address   City, [facility], [facility]   Phone:      Fax:[phone]     Reason for request: continuity of care   Information to be released:    [  ] LAST COLONOSCOPY,  including any PATH REPORT and follow-up  [  ] LAST FIT/COLOGUARD RESULT [  ] LAST DEXA  [  ] LAST MAMMOGRAM  [  ] LAST PAP  [  ] LAST LABS [  ] RETINA EXAM REPORT  [  ] IMMUNIZATION RECORDS  [X  ] Release all info      [  ] Check here and initial the line next to each item to release ALL health information INCLUDING  _____ Care and treatment for drug and / or alcohol abuse  _____ HIV testing, infection status, or AIDS  _____ Genetic Testing    DATES OF SERVICE OR TIME PERIOD TO BE DISCLOSED: _____________  I understand and acknowledge that:  * This Authorization may be revoked at any time by you in writing, except if your health information has already been used or disclosed.  * Your health information that will be used or disclosed as a result of you signing this authorization could be re-disclosed by the recipient. If this occurs, your re-disclosed health information may no longer be protected by State or Federal laws.  * You may refuse to sign this Authorization. Your refusal will not affect your ability to obtain treatment.  * This Authorization becomes effective upon signing and will  on (date) __________.      If no date is indicated, this Authorization will  one (1) year from the signature date.    Name: Autumn Juárez  Signature: Date:    6/3/2025     PLEASE FAX REQUESTED RECORDS BACK TO: [phone]

## 2025-06-02 NOTE — LETTER
[facility]  Master Dash M.D.  [address]  [address]  [place] [address]  Fax: [phone]   Authorization for Release/Disclosure of   Protected Health Information   Name: AUTUMN JUÁREZ : 1960 SSN: [id number]   Address: [address] [place] [address] [address] [place] [address] Phone:    There are no phone numbers on file.   I authorize the entity listed below to release/disclose the PHI below to:   [facility]/Master Dash M.D. and Master Dash M.D.   Provider or Entity Name:  Dr. Mcginnis    Address   City, State, Zip   Phone:      Fax:  [phone]   Reason for request: continuity of care   Information to be released:    [  ] LAST COLONOSCOPY,  including any PATH REPORT and follow-up  [  ] LAST FIT/COLOGUARD RESULT [  ] LAST DEXA  [  ] LAST MAMMOGRAM  [  ] LAST PAP  [  ] LAST LABS [  ] RETINA EXAM REPORT  [  ] IMMUNIZATION RECORDS  [X  ] Release all info      [  ] Check here and initial the line next to each item to release ALL health information INCLUDING  _____ Care and treatment for drug and / or alcohol abuse  _____ HIV testing, infection status, or AIDS  _____ Genetic Testing    DATES OF SERVICE OR TIME PERIOD TO BE DISCLOSED: _____________  I understand and acknowledge that:  * This Authorization may be revoked at any time by you in writing, except if your health information has already been used or disclosed.  * Your health information that will be used or disclosed as a result of you signing this authorization could be re-disclosed by the recipient. If this occurs, your re-disclosed health information may no longer be protected by State or Federal laws.  * You may refuse to sign this Authorization. Your refusal will not affect your ability to obtain treatment.  * This Authorization becomes effective upon signing and will  on (date) __________.      If no date is indicated, this Authorization will  one (1) year from the signature date.    Name: Autumn Juárez  Signature: Date:    6/3/2025     PLEASE FAX REQUESTED RECORDS BACK TO: [phone]

## 2025-06-02 NOTE — TELEPHONE ENCOUNTER
[facility] call [facility], could they send us the pathology report from her colonoscopy done 6/19/2024?

## 2025-06-02 NOTE — TELEPHONE ENCOUNTER
Need old records please  (1) [facility]  (2)  [facility]necology

## 2025-06-04 PROBLEM — L98.9 SKIN LESION: Status: ACTIVE | Noted: 2025-06-04

## 2025-06-10 PROBLEM — D12.6 ADENOMA OF COLON: Status: ACTIVE | Noted: 2025-06-10

## 2025-06-10 NOTE — TELEPHONE ENCOUNTER
Called [facility] to have them fax us the last colonoscopy.     She said she would send it over     Gave paper work to you

## 2025-06-12 DIAGNOSIS — I10 ESSENTIAL HYPERTENSION: ICD-10-CM

## 2025-06-12 RX ORDER — METOPROLOL SUCCINATE 25 MG/1
25 TABLET, EXTENDED RELEASE ORAL
Qty: 100 TABLET | Refills: 2 | Status: SHIPPED | OUTPATIENT
Start: 2025-06-12

## 2025-06-22 DIAGNOSIS — E78.5 DYSLIPIDEMIA: Chronic | ICD-10-CM

## 2025-06-22 RX ORDER — ATORVASTATIN CALCIUM 20 MG/1
20 TABLET, FILM COATED ORAL EVERY EVENING
Qty: 90 TABLET | Refills: 0 | Status: SHIPPED | OUTPATIENT
Start: 2025-06-22

## 2025-06-28 ENCOUNTER — HOSPITAL ENCOUNTER (OUTPATIENT)
Dept: LAB | Facility: MEDICAL CENTER | Age: 65
End: 2025-06-28
Attending: INTERNAL MEDICINE
Payer: OTHER GOVERNMENT

## 2025-06-28 DIAGNOSIS — I10 PRIMARY HYPERTENSION: Chronic | ICD-10-CM

## 2025-06-28 DIAGNOSIS — E55.9 VITAMIN D DEFICIENCY: ICD-10-CM

## 2025-06-28 DIAGNOSIS — E78.5 DYSLIPIDEMIA: Chronic | ICD-10-CM

## 2025-06-28 DIAGNOSIS — Z00.00 PREVENTATIVE HEALTH CARE: ICD-10-CM

## 2025-06-28 DIAGNOSIS — R73.09 IMPAIRED GLUCOSE METABOLISM: ICD-10-CM

## 2025-06-28 LAB
25(OH)D3 SERPL-MCNC: 67 NG/ML (ref 30–100)
ALBUMIN SERPL BCP-MCNC: 4.1 G/DL (ref 3.2–4.9)
ALBUMIN/GLOB SERPL: 1.6 G/DL
ALP SERPL-CCNC: 76 U/L (ref 30–99)
ALT SERPL-CCNC: 28 U/L (ref 2–50)
ANION GAP SERPL CALC-SCNC: 9 MMOL/L (ref 7–16)
APPEARANCE UR: CLEAR
AST SERPL-CCNC: 37 U/L (ref 12–45)
BACTERIA #/AREA URNS HPF: ABNORMAL /HPF
BASOPHILS # BLD AUTO: 1.2 % (ref 0–1.8)
BASOPHILS # BLD: 0.07 K/UL (ref 0–0.12)
BILIRUB SERPL-MCNC: 0.5 MG/DL (ref 0.1–1.5)
BILIRUB UR QL STRIP.AUTO: NEGATIVE
BUN SERPL-MCNC: 31 MG/DL (ref 8–22)
CALCIUM ALBUM COR SERPL-MCNC: 9.1 MG/DL (ref 8.5–10.5)
CALCIUM SERPL-MCNC: 9.2 MG/DL (ref 8.5–10.5)
CASTS URNS QL MICRO: ABNORMAL /LPF (ref 0–2)
CHLORIDE SERPL-SCNC: 102 MMOL/L (ref 96–112)
CHOLEST SERPL-MCNC: 169 MG/DL (ref 100–199)
CO2 SERPL-SCNC: 25 MMOL/L (ref 20–33)
COLOR UR: YELLOW
CREAT SERPL-MCNC: 1.17 MG/DL (ref 0.5–1.4)
CREAT UR-MCNC: 81.3 MG/DL
EOSINOPHIL # BLD AUTO: 0.12 K/UL (ref 0–0.51)
EOSINOPHIL NFR BLD: 2 % (ref 0–6.9)
EPITHELIAL CELLS 1715: ABNORMAL /HPF (ref 0–5)
ERYTHROCYTE [DISTWIDTH] IN BLOOD BY AUTOMATED COUNT: 44.7 FL (ref 35.9–50)
EST. AVERAGE GLUCOSE BLD GHB EST-MCNC: 105 MG/DL
GFR SERPLBLD CREATININE-BSD FMLA CKD-EPI: 52 ML/MIN/1.73 M 2
GLOBULIN SER CALC-MCNC: 2.5 G/DL (ref 1.9–3.5)
GLUCOSE SERPL-MCNC: 92 MG/DL (ref 65–99)
GLUCOSE UR STRIP.AUTO-MCNC: NEGATIVE MG/DL
HBA1C MFR BLD: 5.3 % (ref 4–5.6)
HCT VFR BLD AUTO: 44.7 % (ref 37–47)
HDLC SERPL-MCNC: 64 MG/DL
HGB BLD-MCNC: 14.6 G/DL (ref 12–16)
IMM GRANULOCYTES # BLD AUTO: 0.01 K/UL (ref 0–0.11)
IMM GRANULOCYTES NFR BLD AUTO: 0.2 % (ref 0–0.9)
KETONES UR STRIP.AUTO-MCNC: NEGATIVE MG/DL
LDLC SERPL CALC-MCNC: 92 MG/DL
LEUKOCYTE ESTERASE UR QL STRIP.AUTO: ABNORMAL
LYMPHOCYTES # BLD AUTO: 1.99 K/UL (ref 1–4.8)
LYMPHOCYTES NFR BLD: 32.9 % (ref 22–41)
MCH RBC QN AUTO: 32.1 PG (ref 27–33)
MCHC RBC AUTO-ENTMCNC: 32.7 G/DL (ref 32.2–35.5)
MCV RBC AUTO: 98.2 FL (ref 81.4–97.8)
MICRO URNS: ABNORMAL
MICROALBUMIN UR-MCNC: <1.2 MG/DL
MICROALBUMIN/CREAT UR: NORMAL MG/G (ref 0–30)
MONOCYTES # BLD AUTO: 0.66 K/UL (ref 0–0.85)
MONOCYTES NFR BLD AUTO: 10.9 % (ref 0–13.4)
NEUTROPHILS # BLD AUTO: 3.19 K/UL (ref 1.82–7.42)
NEUTROPHILS NFR BLD: 52.8 % (ref 44–72)
NITRITE UR QL STRIP.AUTO: NEGATIVE
NRBC # BLD AUTO: 0 K/UL
NRBC BLD-RTO: 0 /100 WBC (ref 0–0.2)
PH UR STRIP.AUTO: 6.5 [PH] (ref 5–8)
PLATELET # BLD AUTO: 196 K/UL (ref 164–446)
PMV BLD AUTO: 10.8 FL (ref 9–12.9)
POTASSIUM SERPL-SCNC: 4.6 MMOL/L (ref 3.6–5.5)
PROT SERPL-MCNC: 6.6 G/DL (ref 6–8.2)
PROT UR QL STRIP: NEGATIVE MG/DL
RBC # BLD AUTO: 4.55 M/UL (ref 4.2–5.4)
RBC # URNS HPF: ABNORMAL /HPF (ref 0–2)
RBC UR QL AUTO: NEGATIVE
SODIUM SERPL-SCNC: 136 MMOL/L (ref 135–145)
SP GR UR STRIP.AUTO: 1.01
TRIGL SERPL-MCNC: 65 MG/DL (ref 0–149)
TSH SERPL-ACNC: 2.25 UIU/ML (ref 0.38–5.33)
UROBILINOGEN UR STRIP.AUTO-MCNC: 0.2 EU/DL
WBC # BLD AUTO: 6 K/UL (ref 4.8–10.8)
WBC #/AREA URNS HPF: ABNORMAL /HPF

## 2025-06-28 PROCEDURE — 82570 ASSAY OF URINE CREATININE: CPT

## 2025-06-28 PROCEDURE — 81001 URINALYSIS AUTO W/SCOPE: CPT

## 2025-06-28 PROCEDURE — 83036 HEMOGLOBIN GLYCOSYLATED A1C: CPT

## 2025-06-28 PROCEDURE — 80053 COMPREHEN METABOLIC PANEL: CPT

## 2025-06-28 PROCEDURE — 84443 ASSAY THYROID STIM HORMONE: CPT

## 2025-06-28 PROCEDURE — 82306 VITAMIN D 25 HYDROXY: CPT

## 2025-06-28 PROCEDURE — 82043 UR ALBUMIN QUANTITATIVE: CPT

## 2025-06-28 PROCEDURE — 36415 COLL VENOUS BLD VENIPUNCTURE: CPT

## 2025-06-28 PROCEDURE — 80061 LIPID PANEL: CPT

## 2025-06-28 PROCEDURE — 85025 COMPLETE CBC W/AUTO DIFF WBC: CPT

## 2025-06-28 PROCEDURE — 82172 ASSAY OF APOLIPOPROTEIN: CPT

## 2025-06-28 PROCEDURE — 83695 ASSAY OF LIPOPROTEIN(A): CPT

## 2025-06-30 ENCOUNTER — RESULTS FOLLOW-UP (OUTPATIENT)
Dept: MEDICAL GROUP | Facility: MEDICAL CENTER | Age: 65
End: 2025-06-30
Payer: OTHER GOVERNMENT

## 2025-07-01 LAB
APO B100 SERPL-MCNC: 80 MG/DL (ref 60–117)
LPA SERPL-MCNC: 24 MG/DL

## 2025-07-01 NOTE — TELEPHONE ENCOUNTER
Notified with labs, lipoprotein a and apolipoprotein B still pending, standard lipid panel remains excellent on atorvastatin, A1c improved and now normal, slight decrease in GFR likely dehydrated for her fasting blood test, thyroid function, urinalysis negative, normal TSH and vitamin D, no changes, continue her good nutrition and exercise program, try to keep well-hydrated with good water intake over the summer and with her workouts.

## 2025-07-06 RX ORDER — LOSARTAN POTASSIUM 25 MG/1
25 TABLET ORAL DAILY
Qty: 100 TABLET | Refills: 2 | Status: SHIPPED | OUTPATIENT
Start: 2025-07-06

## 2025-08-22 ENCOUNTER — HOSPITAL ENCOUNTER (OUTPATIENT)
Dept: RADIOLOGY | Facility: MEDICAL CENTER | Age: 65
End: 2025-08-22
Attending: INTERNAL MEDICINE
Payer: OTHER GOVERNMENT

## 2025-08-22 VITALS — WEIGHT: 139 LBS | BODY MASS INDEX: 23.16 KG/M2 | HEIGHT: 65 IN

## 2025-08-22 DIAGNOSIS — Z12.31 ENCOUNTER FOR SCREENING MAMMOGRAM FOR BREAST CANCER: ICD-10-CM

## 2025-08-22 DIAGNOSIS — R92.30 DENSE BREAST: ICD-10-CM

## 2025-08-22 PROCEDURE — 76641 ULTRASOUND BREAST COMPLETE: CPT

## 2025-08-22 PROCEDURE — 77067 SCR MAMMO BI INCL CAD: CPT

## 2025-08-22 ASSESSMENT — FIBROSIS 4 INDEX: FIB4 SCORE: 2.25
